# Patient Record
Sex: MALE | Race: WHITE | NOT HISPANIC OR LATINO | Employment: OTHER | URBAN - METROPOLITAN AREA
[De-identification: names, ages, dates, MRNs, and addresses within clinical notes are randomized per-mention and may not be internally consistent; named-entity substitution may affect disease eponyms.]

---

## 2024-05-16 ENCOUNTER — HOSPITAL ENCOUNTER (EMERGENCY)
Facility: HOSPITAL | Age: 39
Discharge: HOME | End: 2024-05-16
Attending: EMERGENCY MEDICINE
Payer: OTHER GOVERNMENT

## 2024-05-16 VITALS
HEART RATE: 94 BPM | WEIGHT: 240 LBS | DIASTOLIC BLOOD PRESSURE: 76 MMHG | TEMPERATURE: 98.6 F | RESPIRATION RATE: 18 BRPM | BODY MASS INDEX: 33.6 KG/M2 | OXYGEN SATURATION: 99 % | HEIGHT: 71 IN | SYSTOLIC BLOOD PRESSURE: 146 MMHG

## 2024-05-16 DIAGNOSIS — S80.862A INSECT BITE OF LEFT LOWER LEG, INITIAL ENCOUNTER: ICD-10-CM

## 2024-05-16 DIAGNOSIS — L03.116 CELLULITIS OF LEFT LOWER EXTREMITY: Primary | ICD-10-CM

## 2024-05-16 DIAGNOSIS — T78.40XA ALLERGIC REACTION, INITIAL ENCOUNTER: ICD-10-CM
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
## 2024-05-16 PROCEDURE — 99283 EMERGENCY DEPT VISIT LOW MDM: CPT

## 2024-05-16 PROCEDURE — 2500000004 HC RX 250 GENERAL PHARMACY W/ HCPCS (ALT 636 FOR OP/ED): Performed by: EMERGENCY MEDICINE

## 2024-05-16 PROCEDURE — 2500000006 HC RX 250 W HCPCS SELF ADMINISTERED DRUGS (ALT 637 FOR ALL PAYERS): Performed by: EMERGENCY MEDICINE

## 2024-05-16 RX ORDER — PREDNISONE 20 MG/1
40 TABLET ORAL ONCE
Status: COMPLETED | OUTPATIENT
Start: 2024-05-16 | End: 2024-05-16

## 2024-05-16 RX ORDER — PREDNISONE 20 MG/1
40 TABLET ORAL DAILY
Qty: 10 TABLET | Refills: 0 | Status: SHIPPED | OUTPATIENT
Start: 2024-05-16 | End: 2024-05-21

## 2024-05-16 RX ORDER — SULFAMETHOXAZOLE AND TRIMETHOPRIM 800; 160 MG/1; MG/1
1 TABLET ORAL ONCE
Status: COMPLETED | OUTPATIENT
Start: 2024-05-16 | End: 2024-05-16

## 2024-05-16 RX ORDER — FEXOFENADINE HCL 60 MG
60 TABLET ORAL 2 TIMES DAILY
Qty: 20 TABLET | Refills: 0 | Status: SHIPPED | OUTPATIENT
Start: 2024-05-16 | End: 2024-05-26

## 2024-05-16 RX ORDER — SULFAMETHOXAZOLE AND TRIMETHOPRIM 800; 160 MG/1; MG/1
1 TABLET ORAL 2 TIMES DAILY
Qty: 14 TABLET | Refills: 0 | Status: SHIPPED | OUTPATIENT
Start: 2024-05-16 | End: 2024-05-23

## 2024-05-16 RX ADMIN — PREDNISONE 40 MG: 20 TABLET ORAL at 23:15

## 2024-05-16 RX ADMIN — SULFAMETHOXAZOLE AND TRIMETHOPRIM 1 TABLET: 800; 160 TABLET ORAL at 23:15

## 2024-05-16 ASSESSMENT — COLUMBIA-SUICIDE SEVERITY RATING SCALE - C-SSRS
1. IN THE PAST MONTH, HAVE YOU WISHED YOU WERE DEAD OR WISHED YOU COULD GO TO SLEEP AND NOT WAKE UP?: NO
2. HAVE YOU ACTUALLY HAD ANY THOUGHTS OF KILLING YOURSELF?: NO
6. HAVE YOU EVER DONE ANYTHING, STARTED TO DO ANYTHING, OR PREPARED TO DO ANYTHING TO END YOUR LIFE?: NO

## 2024-05-16 ASSESSMENT — LIFESTYLE VARIABLES
TOTAL SCORE: 0
EVER FELT BAD OR GUILTY ABOUT YOUR DRINKING: NO
HAVE PEOPLE ANNOYED YOU BY CRITICIZING YOUR DRINKING: NO
HAVE YOU EVER FELT YOU SHOULD CUT DOWN ON YOUR DRINKING: NO
EVER HAD A DRINK FIRST THING IN THE MORNING TO STEADY YOUR NERVES TO GET RID OF A HANGOVER: NO

## 2024-05-16 ASSESSMENT — PAIN DESCRIPTION - LOCATION: LOCATION: LEG

## 2024-05-16 ASSESSMENT — PAIN - FUNCTIONAL ASSESSMENT: PAIN_FUNCTIONAL_ASSESSMENT: 0-10

## 2024-05-16 ASSESSMENT — PAIN DESCRIPTION - PAIN TYPE: TYPE: ACUTE PAIN

## 2024-05-16 ASSESSMENT — PAIN SCALES - GENERAL: PAINLEVEL_OUTOF10: 4

## 2024-05-17 NOTE — ED PROVIDER NOTES
Emergency Department Provider Note        MEDICAL DECISION MAKING:  Medical Decision Making  Risk  OTC drugs.  Prescription drug management.         5/16/24     Chief Complaint   Patient presents with    Insect Bite     Left lower leg      History/Exam limitations: none.   Additional history was obtained from patient.    HPI: Dc Pastor Pt presents for an insect bite to the left lower leg. Pt thinks that he got bitten when he was moving wood this morning. Bite area is now red and swollen. Tender to the touch and hurts to walk on. Pt states that he had a 101 degree temp today. Took meds with improvement.   Past medical records, Past medical history and surgical history reviewed and as documented.  History reviewed and as noted. past medical records reviewed.    Active Ambulatory Problems     Diagnosis Date Noted    No Active Ambulatory Problems     Resolved Ambulatory Problems     Diagnosis Date Noted    No Resolved Ambulatory Problems     No Additional Past Medical History        Past Surgical History:   Procedure Laterality Date    OTHER SURGICAL HISTORY  03/08/2021    Cholecystectomy    OTHER SURGICAL HISTORY  03/08/2021    Knee surgery    OTHER SURGICAL HISTORY  03/08/2021    Oral surgery        No family history on file.           No Known Allergies     Unless otherwise stated in this report the patient's positive and negative responses for review of systems for constitutional, eyes, ENT, cardiovascular, respiratory, gastrointestinal, neurological, genitourinary, musculoskeletal, and integument systems and related systems to the presenting problem are either as stated in the HPI or were not pertinent or were negative for the symptoms and/or complaints related to the presenting medical problem.    PHYSICAL EXAM  Triage/nursing notes and vital signs reviewed as available and as noted    Vitals:    05/16/24 2210   BP: 150/86   Pulse: 93   Resp: 16   Temp: 37.4 °C (99.4 °F)   TempSrc: Skin   SpO2: 97%  "  Weight: 109 kg (240 lb)   Height: 1.803 m (5' 11\")       Vital Signs reviewed and noted to be within normal limits. the patient is not hypoxic.  -General: Alert, no acute distress, patient resting comfortably  -Skin: warm, intact, no pallor noted  -Head: Normocephalic, atraumatic  -Eye: Normal conjunctiva  -Cardiac: Normal peripheral perfusion  -Respiratory: No acute distress  -Musculoskeletal: No deformity, full ROM.  Patient does have a insect bite on the left anterior lower leg with some surrounding erythema.  Small scab noted.  No purulent drainage.  Compartments soft but there is some mild localized swelling and erythema.  No calf tenderness.  No crepitus.  Joints are not affected.  Normal range of motion.  No pain out of proportion.  Neurovascularly intact.  -Neurological: alert and oriented, normal sensory and motor observed.  -Psychiatric: Cooperative    ED COURSE  Current subjective and objective findings, differential diagnosis includes but not limited to insect bite, localized reaction, cellulitis      Work-up performed to evaluate for differential diagnosis as clinically indicated   Orders Placed This Encounter   Procedures    Referral to Primary Care          Labs and imaging reviewed by me  and note         Labs Reviewed - No data to display     No orders to display            Pt course which Intervention and treatment included :    Procedure  Procedures    Medications   sulfamethoxazole-trimethoprim (Bactrim DS) 800-160 mg per tablet 1 tablet (has no administration in time range)   predniSONE (Deltasone) tablet 40 mg (has no administration in time range)        ED Course as of 05/16/24 2305   Thu May 16, 2024   2304 Patient has no clearance noted, however does report this.  Will prescribe Bactrim.  It is likely somewhat early given the longevity of symptoms however so allergic reaction is also considered especially localized.  No other systemic symptoms.  Will treat with outpatient therapy at this " time and return precautions discussed. [ML]      ED Course User Index  [ML] Marty R Lejeune, DO         Diagnoses as of 05/16/24 0490   Cellulitis of left lower extremity   Insect bite of left lower leg, initial encounter   Allergic reaction, initial encounter          DISPOSITION: Patient is stable for discharge.  Based upon my history, physical exam, evaluation and judgement regarding the aforementioned differentials, at the time of this assessment, I do not see evidence to support the presence of any life, neurologic, or limb threatening pathology in my considered differentials. Alternate non life threatening pathology has been considered, and has been ruled out based upon the findings of my evaluation. While there may be remaining pathology considered within the differential, this is not life threatening, not limb threatening, and does not warrant further emergent evaluation or treatment at this time.  Shared decision making made with the patient as applicable.  It is my judgement that further treatment and evaluation can safely proceed in the outpatient setting. Shared decision making was utilized to arrive at all clinical decisions. At this time I do not see evidence of an emergency medical condition based upon my medical screening examination.  All imaging and laboratory results were discussed with the patient in detail including acute as well as incidental findings.  I discussed the differential, results and discharge plan with the patient and/or family/friend/caregiver if present. Education and reassurance provided regards to presumed diagnosis. I emphasized the importance of follow-up with the physician I referred them to in the timeframe recommended. I explained reasons for the patient to return to the Emergency Department. Additional verbal discharge instructions were also given and discussed with the patient to supplement those generated by the EMR. We also discussed medications that were prescribed (if  any) including common side effects and interactions as well as proper dosing and administration. The patient was advised to abstain from driving, operating heavy machinery or making significant decisions while taking medications such as opiates and muscle relaxers that may impair this. All questions were addressed. They understand return precautions and discharge instructions. The patient and/or family/friend/caregiver expressed understanding    1. Cellulitis of left lower extremity  sulfamethoxazole-trimethoprim (Bactrim DS) 800-160 mg tablet      2. Insect bite of left lower leg, initial encounter  predniSONE (Deltasone) 20 mg tablet      3. Allergic reaction, initial encounter  predniSONE (Deltasone) 20 mg tablet    fexofenadine (Allegra) 60 mg tablet         -------------------------------------------------------------------    05/16/24 at 10:45 PM - Marty R LeJeune, DO   Internal & Emergency Medicine          Marty R Lejeune, DO  Resident  05/16/24 1126

## 2024-05-17 NOTE — ED TRIAGE NOTES
Pt presents for an insect bite to the left lower leg. Pt thinks that he got bitten when he was moving wood this morning. Bite area is now red and swollen. Tender to the touch and hurts to walk on. Pt states that he had a 101 degree temp today. Took meds with improvement.

## 2024-06-06 ENCOUNTER — DOCUMENTATION (OUTPATIENT)
Dept: TRANSPLANT | Facility: HOSPITAL | Age: 39
End: 2024-06-06
Payer: OTHER GOVERNMENT

## 2024-06-06 NOTE — PROGRESS NOTES
Intake reviewed. Attempted to reach Tim and a message was left for him to return our call. He is okay to schedule for virtual donor education.

## 2024-06-14 ENCOUNTER — TELEPHONE (OUTPATIENT)
Dept: TRANSPLANT | Facility: HOSPITAL | Age: 39
End: 2024-06-14
Payer: OTHER GOVERNMENT

## 2024-07-12 ENCOUNTER — TELEPHONE (OUTPATIENT)
Dept: TRANSPLANT | Facility: HOSPITAL | Age: 39
End: 2024-07-12
Payer: OTHER GOVERNMENT

## 2024-07-15 ENCOUNTER — APPOINTMENT (OUTPATIENT)
Dept: TRANSPLANT | Facility: HOSPITAL | Age: 39
End: 2024-07-15
Payer: OTHER GOVERNMENT

## 2024-07-15 ENCOUNTER — DOCUMENTATION (OUTPATIENT)
Dept: TRANSPLANT | Facility: HOSPITAL | Age: 39
End: 2024-07-15
Payer: OTHER GOVERNMENT

## 2024-07-15 NOTE — PROGRESS NOTES
Patient received education regarding the following topics as part of their living donor evaluation:  The evaluation process, including:  ·     Transplant team members and roles   ·     Required consultations and testing  ·     Selection criteria and suitability for donation  ·     Psychosocial and financial considerations for a successful transplant  ·     Patient responsibilities, including the necessity of adhering to a strict medical regimen  An overview of the surgical procedure   Potential medical, surgical, and psychosocial risks to transplantation, including:  ·     Wound infection  ·     Pneumonia  ·     Blood clot formation  ·     Complications with remaining kidney including kidney failure and need for dialysis or kidney transplant  ·     Arrhythmias and cardiovascular collapse  ·     Multi-organ system failure  ·     Death  ·     Depression  ·     Post-Traumatic Stress Disorder  ·     Generalized anxiety, issues of dependence, and feelings of guilt  Available alternatives to transplantation  National and [facility] outcomes for one-year patient and graft-survival from the most recent [facility] program-specific report.   Donor risk factors that could affect the success of the transplant and the health of the patient, including:  ·     Donor age  ·     Donor medical and social history  ·     Condition of the organ  ·     Risk of rod cancer, HIV, Hepatitis B, Hepatitis C, or malaria if the infection is not detectable at the time of donation  Patient's right to withdraw consent for donation at any time during the process. Patient's consent to donate willingly and without pressure (coercion) from anyone, and will not receive payment or financial reward for donating organ.  Transplants not performed in a Medicare-approved transplant center could affect the patient's ability to have immunosuppression medication paid for under Medicare part B.   Multiple listing options.     Patient was given the  opportunity to have questions answered and understands that the living donor team will be available to assist the patient throughout the entire donation process. Patient was provided a copy of the signed informed consent for living donor donation.     Signed evaluation informed consent received? 07/15/2024

## 2024-07-17 DIAGNOSIS — Z52.4 DONOR OF KIDNEY FOR TRANSPLANT: Primary | ICD-10-CM

## 2024-07-24 ENCOUNTER — TELEPHONE (OUTPATIENT)
Dept: TRANSPLANT | Facility: HOSPITAL | Age: 39
End: 2024-07-24

## 2024-08-08 DIAGNOSIS — Z52.4 DONOR OF KIDNEY FOR TRANSPLANT: Primary | ICD-10-CM

## 2024-08-20 ENCOUNTER — TELEPHONE (OUTPATIENT)
Dept: TRANSPLANT | Facility: HOSPITAL | Age: 39
End: 2024-08-20
Payer: OTHER GOVERNMENT

## 2024-08-28 ENCOUNTER — LAB (OUTPATIENT)
Dept: LAB | Facility: LAB | Age: 39
End: 2024-08-28
Payer: SUBSIDIZED

## 2024-08-28 ENCOUNTER — HOSPITAL ENCOUNTER (OUTPATIENT)
Dept: CARDIOLOGY | Facility: HOSPITAL | Age: 39
Discharge: HOME | End: 2024-08-28
Payer: OTHER GOVERNMENT

## 2024-08-28 ENCOUNTER — SOCIAL WORK (OUTPATIENT)
Dept: TRANSPLANT | Facility: HOSPITAL | Age: 39
End: 2024-08-28
Payer: OTHER GOVERNMENT

## 2024-08-28 ENCOUNTER — APPOINTMENT (OUTPATIENT)
Dept: TRANSPLANT | Facility: HOSPITAL | Age: 39
End: 2024-08-28
Payer: OTHER GOVERNMENT

## 2024-08-28 ENCOUNTER — HOSPITAL ENCOUNTER (OUTPATIENT)
Dept: RADIOLOGY | Facility: HOSPITAL | Age: 39
Discharge: HOME | End: 2024-08-28
Payer: OTHER GOVERNMENT

## 2024-08-28 ENCOUNTER — OFFICE VISIT (OUTPATIENT)
Dept: TRANSPLANT | Facility: HOSPITAL | Age: 39
End: 2024-08-28
Payer: OTHER GOVERNMENT

## 2024-08-28 ENCOUNTER — NURSE ONLY (OUTPATIENT)
Facility: HOSPITAL | Age: 39
End: 2024-08-28
Payer: OTHER GOVERNMENT

## 2024-08-28 ENCOUNTER — DOCUMENTATION (OUTPATIENT)
Dept: TRANSPLANT | Facility: HOSPITAL | Age: 39
End: 2024-08-28
Payer: OTHER GOVERNMENT

## 2024-08-28 VITALS
SYSTOLIC BLOOD PRESSURE: 130 MMHG | TEMPERATURE: 97.3 F | DIASTOLIC BLOOD PRESSURE: 86 MMHG | OXYGEN SATURATION: 96 % | HEIGHT: 71 IN | WEIGHT: 258.4 LBS | HEART RATE: 74 BPM | BODY MASS INDEX: 36.18 KG/M2

## 2024-08-28 VITALS
HEIGHT: 71 IN | WEIGHT: 258.4 LBS | OXYGEN SATURATION: 96 % | DIASTOLIC BLOOD PRESSURE: 86 MMHG | HEART RATE: 74 BPM | SYSTOLIC BLOOD PRESSURE: 130 MMHG | TEMPERATURE: 97.3 F | BODY MASS INDEX: 36.18 KG/M2

## 2024-08-28 DIAGNOSIS — Z52.4 DONOR OF KIDNEY FOR TRANSPLANT: Primary | ICD-10-CM

## 2024-08-28 DIAGNOSIS — Z00.5 ENCOUNTER FOR EVALUATION TO BE TRANSPLANT DONOR: Primary | ICD-10-CM

## 2024-08-28 DIAGNOSIS — Z52.4 DONOR OF KIDNEY FOR TRANSPLANT: ICD-10-CM

## 2024-08-28 LAB
ABO GROUP (TYPE) IN BLOOD: NORMAL
ALBUMIN (MG/24HR) IN 24 HOUR URINE: NORMAL
ALBUMIN SERPL BCP-MCNC: 4.4 G/DL (ref 3.4–5)
ALP SERPL-CCNC: 52 U/L (ref 33–120)
ALT SERPL W P-5'-P-CCNC: 24 U/L (ref 10–52)
ANION GAP SERPL CALC-SCNC: 14 MMOL/L (ref 10–20)
APTT PPP: 34 SECONDS (ref 27–38)
AST SERPL W P-5'-P-CCNC: 27 U/L (ref 9–39)
BILIRUB SERPL-MCNC: 0.4 MG/DL (ref 0–1.2)
BUN SERPL-MCNC: 14 MG/DL (ref 6–23)
CALCIUM SERPL-MCNC: 9.2 MG/DL (ref 8.6–10.6)
CHLORIDE SERPL-SCNC: 103 MMOL/L (ref 98–107)
CHOLEST SERPL-MCNC: 163 MG/DL (ref 0–199)
CHOLESTEROL/HDL RATIO: 3.4
CMV IGG AVIDITY SERPL IA-RTO: REACTIVE %
CO2 SERPL-SCNC: 27 MMOL/L (ref 21–32)
COLLECT DURATION TIME SPEC: 24 HRS
CREAT 24H UR-MCNC: 204.1 MG/DL (ref 20–370)
CREAT 24H UR-MRATE: 2.03 G/24 H (ref 0.87–2.41)
CREAT CL 24H UR+SERPL-VRATE: 116 ML/MIN
CREAT SERPL-MCNC: 1.21 MG/DL (ref 0.5–1.3)
CREAT SERPL-MCNC: 1.21 MG/DL (ref 0.5–1.3)
EBV EA IGG SER QL: NEGATIVE
EBV NA AB SER QL: POSITIVE
EBV VCA IGG SER IA-ACNC: POSITIVE
EBV VCA IGM SER IA-ACNC: NEGATIVE
EGFRCR SERPLBLD CKD-EPI 2021: 79 ML/MIN/1.73M*2
EGFRCR SERPLBLD CKD-EPI 2021: 79 ML/MIN/1.73M*2
ERYTHROCYTE [DISTWIDTH] IN BLOOD BY AUTOMATED COUNT: 13 % (ref 11.5–14.5)
EST. AVERAGE GLUCOSE BLD GHB EST-MCNC: 108 MG/DL
GLUCOSE P FAST SERPL-MCNC: 95 MG/DL (ref 74–99)
GLUCOSE SERPL-MCNC: 95 MG/DL (ref 74–99)
HBA1C MFR BLD: 5.4 %
HBV CORE IGM SER QL: NONREACTIVE
HBV SURFACE AB SER-ACNC: 218.3 MIU/ML
HBV SURFACE AG SERPL QL IA: NONREACTIVE
HCT VFR BLD AUTO: 42.3 % (ref 41–52)
HCV AB SER QL: NONREACTIVE
HDLC SERPL-MCNC: 48.2 MG/DL
HGB BLD-MCNC: 14.3 G/DL (ref 13.5–17.5)
HIV 1+2 AB+HIV1 P24 AG SERPL QL IA: NONREACTIVE
INR PPP: 1 (ref 0.9–1.1)
LDLC SERPL CALC-MCNC: 73 MG/DL
MCH RBC QN AUTO: 29.9 PG (ref 26–34)
MCHC RBC AUTO-ENTMCNC: 33.8 G/DL (ref 32–36)
MCV RBC AUTO: 89 FL (ref 80–100)
MICROALBUMIN PANEL 24H UR: <7 MG/L
NON HDL CHOLESTEROL: 115 MG/DL (ref 0–149)
NRBC BLD-RTO: 0 /100 WBCS (ref 0–0)
PHOSPHATE SERPL-MCNC: 2.9 MG/DL (ref 2.5–4.9)
PLATELET # BLD AUTO: 225 X10*3/UL (ref 150–450)
POTASSIUM SERPL-SCNC: 4.1 MMOL/L (ref 3.5–5.3)
PROT 24H UR-MCNC: 6 MG/DL (ref 5–25)
PROT 24H UR-MRATE: 60 MG/24H (ref 0–149)
PROT SERPL-MCNC: 6.8 G/DL (ref 6.4–8.2)
PROTHROMBIN TIME: 10.8 SECONDS (ref 9.8–12.8)
RBC # BLD AUTO: 4.78 X10*6/UL (ref 4.5–5.9)
RH FACTOR (ANTIGEN D): NORMAL
SODIUM SERPL-SCNC: 140 MMOL/L (ref 136–145)
SPECIMEN VOL 24H UR: 994 ML
TREPONEMA PALLIDUM IGG+IGM AB [PRESENCE] IN SERUM OR PLASMA BY IMMUNOASSAY: NONREACTIVE
TRIGL SERPL-MCNC: 207 MG/DL (ref 0–149)
URATE SERPL-MCNC: 6.5 MG/DL (ref 4–7.5)
VLDL: 41 MG/DL (ref 0–40)
WBC # BLD AUTO: 5.2 X10*3/UL (ref 4.4–11.3)

## 2024-08-28 PROCEDURE — 85730 THROMBOPLASTIN TIME PARTIAL: CPT

## 2024-08-28 PROCEDURE — 82043 UR ALBUMIN QUANTITATIVE: CPT

## 2024-08-28 PROCEDURE — 86664 EPSTEIN-BARR NUCLEAR ANTIGEN: CPT

## 2024-08-28 PROCEDURE — 86900 BLOOD TYPING SEROLOGIC ABO: CPT

## 2024-08-28 PROCEDURE — 85610 PROTHROMBIN TIME: CPT

## 2024-08-28 PROCEDURE — 84100 ASSAY OF PHOSPHORUS: CPT

## 2024-08-28 PROCEDURE — 85027 COMPLETE CBC AUTOMATED: CPT

## 2024-08-28 PROCEDURE — 82565 ASSAY OF CREATININE: CPT

## 2024-08-28 PROCEDURE — 84550 ASSAY OF BLOOD/URIC ACID: CPT

## 2024-08-28 PROCEDURE — 82570 ASSAY OF URINE CREATININE: CPT

## 2024-08-28 PROCEDURE — 87340 HEPATITIS B SURFACE AG IA: CPT

## 2024-08-28 PROCEDURE — 80053 COMPREHEN METABOLIC PANEL: CPT

## 2024-08-28 PROCEDURE — 81050 URINALYSIS VOLUME MEASURE: CPT

## 2024-08-28 PROCEDURE — 86663 EPSTEIN-BARR ANTIBODY: CPT

## 2024-08-28 PROCEDURE — 84156 ASSAY OF PROTEIN URINE: CPT

## 2024-08-28 PROCEDURE — 2550000001 HC RX 255 CONTRASTS: Performed by: SURGERY

## 2024-08-28 PROCEDURE — 86705 HEP B CORE ANTIBODY IGM: CPT

## 2024-08-28 PROCEDURE — 82575 CREATININE CLEARANCE TEST: CPT

## 2024-08-28 PROCEDURE — 83036 HEMOGLOBIN GLYCOSYLATED A1C: CPT

## 2024-08-28 PROCEDURE — 82947 ASSAY GLUCOSE BLOOD QUANT: CPT

## 2024-08-28 PROCEDURE — 86780 TREPONEMA PALLIDUM: CPT

## 2024-08-28 PROCEDURE — 93005 ELECTROCARDIOGRAM TRACING: CPT

## 2024-08-28 PROCEDURE — 87389 HIV-1 AG W/HIV-1&-2 AB AG IA: CPT

## 2024-08-28 PROCEDURE — 86803 HEPATITIS C AB TEST: CPT

## 2024-08-28 PROCEDURE — 74174 CTA ABD&PLVS W/CONTRAST: CPT

## 2024-08-28 PROCEDURE — 80061 LIPID PANEL: CPT

## 2024-08-28 PROCEDURE — 86901 BLOOD TYPING SEROLOGIC RH(D): CPT

## 2024-08-28 PROCEDURE — 99215 OFFICE O/P EST HI 40 MIN: CPT | Performed by: TRANSPLANT SURGERY

## 2024-08-28 PROCEDURE — 86665 EPSTEIN-BARR CAPSID VCA: CPT

## 2024-08-28 PROCEDURE — 99214 OFFICE O/P EST MOD 30 MIN: CPT | Mod: 25

## 2024-08-28 PROCEDURE — 86706 HEP B SURFACE ANTIBODY: CPT

## 2024-08-28 PROCEDURE — 86644 CMV ANTIBODY: CPT

## 2024-08-28 ASSESSMENT — ENCOUNTER SYMPTOMS
CONFUSION: 0
ADENOPATHY: 0
HEMATURIA: 0
NERVOUS/ANXIOUS: 0
HEADACHES: 0
CONSTIPATION: 0
HALLUCINATIONS: 0
SHORTNESS OF BREATH: 0
FREQUENCY: 0
DIARRHEA: 0
LIGHT-HEADEDNESS: 0
WEAKNESS: 0
CONFUSION: 0
BACK PAIN: 0
COUGH: 0
FEVER: 0
DIZZINESS: 0
AGITATION: 0
VOMITING: 0
PALPITATIONS: 0
EYES NEGATIVE: 1
HEMATURIA: 0
COLOR CHANGE: 0
DYSURIA: 0
DIARRHEA: 0
FREQUENCY: 0
CHEST TIGHTNESS: 0
NAUSEA: 0
ARTHRALGIAS: 0
COUGH: 0
ABDOMINAL DISTENTION: 0
ABDOMINAL DISTENTION: 0
ABDOMINAL PAIN: 0
CHILLS: 0
SHORTNESS OF BREATH: 0

## 2024-08-28 ASSESSMENT — PATIENT HEALTH QUESTIONNAIRE - PHQ9
8. MOVING OR SPEAKING SO SLOWLY THAT OTHER PEOPLE COULD HAVE NOTICED. OR THE OPPOSITE, BEING SO FIGETY OR RESTLESS THAT YOU HAVE BEEN MOVING AROUND A LOT MORE THAN USUAL: NOT AT ALL
3. TROUBLE FALLING OR STAYING ASLEEP OR SLEEPING TOO MUCH: NOT AT ALL
SUM OF ALL RESPONSES TO PHQ QUESTIONS 1-9: 0
5. POOR APPETITE OR OVEREATING: NOT AT ALL
1. LITTLE INTEREST OR PLEASURE IN DOING THINGS: NOT AT ALL
6. FEELING BAD ABOUT YOURSELF - OR THAT YOU ARE A FAILURE OR HAVE LET YOURSELF OR YOUR FAMILY DOWN: NOT AT ALL
1. LITTLE INTEREST OR PLEASURE IN DOING THINGS: NOT AT ALL
4. FEELING TIRED OR HAVING LITTLE ENERGY: NOT AT ALL
7. TROUBLE CONCENTRATING ON THINGS, SUCH AS READING THE NEWSPAPER OR WATCHING TELEVISION: NOT AT ALL
2. FEELING DOWN, DEPRESSED OR HOPELESS: NOT AT ALL
9. THOUGHTS THAT YOU WOULD BE BETTER OFF DEAD, OR OF HURTING YOURSELF: NOT AT ALL
SUM OF ALL RESPONSES TO PHQ9 QUESTIONS 1 AND 2: 0
SUM OF ALL RESPONSES TO PHQ9 QUESTIONS 1 & 2: 0
2. FEELING DOWN, DEPRESSED OR HOPELESS: NOT AT ALL

## 2024-08-28 ASSESSMENT — ANXIETY QUESTIONNAIRES
1. FEELING NERVOUS, ANXIOUS, OR ON EDGE: NOT AT ALL
6. BECOMING EASILY ANNOYED OR IRRITABLE: NOT AT ALL
5. BEING SO RESTLESS THAT IT IS HARD TO SIT STILL: NOT AT ALL
GAD7 TOTAL SCORE: 0
7. FEELING AFRAID AS IF SOMETHING AWFUL MIGHT HAPPEN: NOT AT ALL
4. TROUBLE RELAXING: NOT AT ALL
2. NOT BEING ABLE TO STOP OR CONTROL WORRYING: NOT AT ALL
3. WORRYING TOO MUCH ABOUT DIFFERENT THINGS: NOT AT ALL

## 2024-08-28 ASSESSMENT — PAIN SCALES - GENERAL
PAINLEVEL: 0-NO PAIN
PAINLEVEL: 0-NO PAIN

## 2024-08-28 NOTE — PROGRESS NOTES
Subjective   Dc Pastor is a 38 y.o. male who presents for donor evaluation visit.     Patient is a healthy 38 male who wants to be a kidney donor.  His intended recipient is from [place] and on dialysis, he is unsure whether she is listed for transplant currently or at which center.    He denies any significant medical history such as HTN, DM, kidney diseases  PSHx: lap radha and knee surgery    Shx: non smoker, Vincent    Fhx: mother with cervical ca      Review of Systems   Constitutional:  Negative for chills and fever.   HENT: Negative.  Negative for congestion.    Eyes: Negative.    Respiratory:  Negative for cough and shortness of breath.    Cardiovascular:  Negative for chest pain.   Gastrointestinal:  Negative for abdominal distention, abdominal pain, constipation and diarrhea.   Endocrine: Negative for cold intolerance and heat intolerance.   Genitourinary:  Negative for dysuria, frequency, hematuria and urgency.   Musculoskeletal:  Negative for arthralgias.   Skin:  Negative for color change.   Allergic/Immunologic: Negative for environmental allergies.   Neurological:  Negative for dizziness, weakness and light-headedness.   Hematological:  Negative for adenopathy.   Psychiatric/Behavioral:  Negative for agitation, confusion and hallucinations.         Objective   Vitals:    08/28/24 0902   BP: 130/86   Pulse:    Temp:    SpO2:        Physical Exam  Constitutional:       Appearance: Normal appearance.      Comments: Muscular appearance   HENT:      Head: Normocephalic and atraumatic.      Nose: Nose normal.   Eyes:      Pupils: Pupils are equal, round, and reactive to light.   Cardiovascular:      Rate and Rhythm: Normal rate.   Pulmonary:      Effort: Pulmonary effort is normal. No respiratory distress.   Abdominal:      General: There is no distension.      Palpations: Abdomen is soft. There is no mass.      Comments: Flat abdomen   Musculoskeletal:         General: No swelling. Normal range of  "motion.      Cervical back: Normal range of motion.   Skin:     General: Skin is warm and dry.   Neurological:      General: No focal deficit present.      Mental Status: He is alert and oriented to person, place, and time.   Psychiatric:         Mood and Affect: Mood normal.         Behavior: Behavior normal.          Lab Review    Blood Type: No results found for: \"ABORH\"  Lab Results   Component Value Date    CREATININE 1.06 02/21/2021    K 3.7 02/21/2021    GLUCOSE 114 (H) 02/21/2021    HCT 44.3 02/21/2021    WBC 7.7 02/21/2021     02/21/2021    CALCIUM 9.5 02/21/2021     Lab Results   Component Value Date    WBC 7.7 02/21/2021    HGB 15.2 02/21/2021    HCT 44.3 02/21/2021    MCV 89 02/21/2021     02/21/2021     Lab Results   Component Value Date    GLUCOSE 114 (H) 02/21/2021    CALCIUM 9.5 02/21/2021     02/21/2021    K 3.7 02/21/2021    CO2 29 02/21/2021     02/21/2021    BUN 15 02/21/2021    CREATININE 1.06 02/21/2021     Kidney donor education and discussion    I had a long discussion with the patient concerning the risks of kidney donation. I specifically pointed out that the donor gets no benefit from this medical intervention and only incurs risk. We discussed the fact that they should feel comfortable removing themselves from the donation process at any time should they feel uncomfortable with donating.     The confidentiality of the donor will be maintained at all times. I reiterated that I cannot share medical information with the donor about the recipient or vice versa. I reiterated the fact that the recipient may have risk factors for a bad outcome that I cannot necessarily share with the donor. It is a federal crime to receive any compensation monetary or otherwise for donating a kidney. If we find out this is occurring, we will terminate the process.     We discussed the importance of not smoking. We discussed the risks of the surgery which include but are not limited to " bleeding, infection, scarring, pain, DVT, PE, kidney failure, organ failure, heart attack, stroke, damage to surrounding structures, obesity, fatigue, nausea/bloating, risk for small bowel obstruction, risk for development of hernias, and death.     We discussed the post-operative course both in the hospital and once they are discharged. We discussed the follow-up schedule which will be 2 weeks post-surgery with me and at 6 months 1 year and 2 years post-surgery with nephrology. We discussed the financial implications of donation including job loss and difficulties obtaining insurances. We discussed the possibility of trauma to the one remaining kidney and should this happen they may going to kidney failure. We discussed the fact that NSAID use should be avoided for the rest of their life. If they do in fact developed kidney failure, they will require dialysis. They do receive extra points to move up the  donor list should they require a kidney transplant themselves.    Further work-up includes:  Time was given to provide answers to all questions.  The donor expressed understanding and wished to proceed with donation.   The donor will be taken to committee for final decision on appropriateness once the entire evaluation is completed.         Assessment/Plan    Diagnoses:   1. Donor of kidney for transplant      Dc Pastor is a good candidate for kidney donation.  His current BMI is 36.5 and I advised him to lose some weight  ~10 Ibs weight loss will bring his BMI < 35.  But his build is quite muscular in chest/arm/thighs and the abdomen is flat, therefore suitable  His BP in clinic is 146/93 initially and repeat is 130/86, we will obtain 24 hour home monitoring  History of depression and stable on medication

## 2024-08-28 NOTE — PROGRESS NOTES
Spoke to pt on phone for virtual donor apt Pt understands he is not to be billed for txp related services. Discussed Preex & LI access. Pt understands if treatment would be needed that would not be txp related he would be billed.

## 2024-08-28 NOTE — PROGRESS NOTES
"Transplant Nephrology Evaluation of Donor :    Dc Pastor was here to get evaluated for being a potential Donor .    History of Present illness: Mr. Irwin is a 38-year-old healthy male with no significant medical history was here to be evaluated to be a potential donor to his friend's assisted who currently is under process of evaluation in [place] .  -He does have posttraumatic stress disorder when he was in [employer] and was currently on escitalopram and bupropion.  He has been currently stable never been institutionalized because of PTSD.  -He denied any history of hypertension or diabetes or kidney stones or urinary tract infections.  -He denied any coronary artery disease or history of stroke or GI or lung issues.    Past Medical HX: PTSD on medications otherwise no other medical history    Surgical Hx: History of cholecystectomy in  and left knee reconstruction in     Family Hx : Mom had cervical cancer survivor she is alive, dad had any atrophic lateral sclerosis and  at the age of 51.  His sister is healthy and had 2 daughters who are healthy    Social Hx : He is currently having [employer] self-employment lives with his wife and 2 kids.  Drinks occasionally denied any smoking or use of drugs    Review of Systems   Respiratory:  Negative for cough, chest tightness and shortness of breath.    Cardiovascular:  Negative for chest pain, palpitations and leg swelling.   Gastrointestinal:  Negative for abdominal distention, diarrhea, nausea and vomiting.   Genitourinary:  Negative for frequency, hematuria and urgency.   Musculoskeletal:  Negative for back pain.   Neurological:  Negative for headaches.   Psychiatric/Behavioral:  Negative for confusion. The patient is not nervous/anxious.         Objective:  Visit Vitals  /86 (BP Location: Left arm)   Pulse 74   Temp 36.3 °C (97.3 °F) (Temporal)   Ht 1.791 m (5' 10.5\")   Wt 117 kg (258 lb 6.4 oz)   SpO2 96%   BMI 36.55 kg/m²   Smoking " Status Former   BSA 2.41 m²      Physical Exam  HENT:      Head: Normocephalic and atraumatic.      Nose: Nose normal.      Mouth/Throat:      Mouth: Mucous membranes are moist.   Eyes:      Extraocular Movements: Extraocular movements intact.      Pupils: Pupils are equal, round, and reactive to light.   Cardiovascular:      Rate and Rhythm: Normal rate.      Pulses: Normal pulses.      Heart sounds: Normal heart sounds.   Pulmonary:      Effort: Pulmonary effort is normal.   Abdominal:      Palpations: Abdomen is soft.      Tenderness: There is no abdominal tenderness. There is no guarding.   Musculoskeletal:         General: Normal range of motion.      Cervical back: Normal range of motion.   Skin:     General: Skin is warm.   Neurological:      General: No focal deficit present.      Mental Status: Mental status is at baseline.   Psychiatric:         Mood and Affect: Mood normal.            Current Outpatient Medications:     fexofenadine (Allegra) 60 mg tablet, Take 1 tablet (60 mg) by mouth 2 times a day for 10 days., Disp: 20 tablet, Rfl: 0     [unfilled]     No images are attached to the encounter.     Assessment and Plan :Dc Pastor 38 y.o. healthy male with no significant medical history was here to be evaluated to be a potential donor to his friend's assisted who currently is under process of evaluation in [place] .    -Seems to be a good candidate for being a potential donor.  -His blood pressure on initial measurement is around 146/93 and repeat is 130/86 considering likely whitecoat hypertension but requested for 24-hour monitor with ambulatory blood pressure monitoring.  -His current kidney function with EGFR is 79 with creatinine of 1.21, Cystatin C EGFR is around 98.  Awaiting for 24-hour creatinine clearance calculations.  -Urinary albumin is less than 7, total protein in the urine is around 6 which is normal, urine analysis is bland.  -His A1c is around 5.4-known  diabetic.  -His body mass index is a mildly higher than required recommended lifestyle modifications with regular exercise.  -Will follow-up with the imaging and further testing according to our donor protocol.  -Will discuss in the committee once we get updated testing.    I had a long discussion with the patient concerning the risks of kidney donation. I specifically pointed out that the donor gets no benefit from this medical intervention and only incurs risk. We discussed the fact that they should feel comfortable removing themselves from the donation process at any time should they feel uncomfortable with donating.     The confidentiality of the donor will be maintained at all times. I reiterated that I cannot share medical information with the donor about the recipient or vice versa. I reiterated the fact that the recipient may have risk factors for a bad outcome that I cannot necessarily share with the donor. It is a federal crime to receive any compensation monetary or otherwise for donating a kidney. If we find out this is occurring, we will terminate the process.     We discussed the importance of not smoking. We discussed the risks of the surgery which include but are not limited to bleeding, infection, scarring, pain, DVT, PE, kidney failure, organ failure, heart attack, stroke, damage to surrounding structures, obesity, fatigue, nausea/bloating, risk for small bowel obstruction, risk for development of hernias, and death.     We discussed the post-operative course both in the hospital and once they are discharged. We discussed the follow-up schedule which will be 2 weeks post-surgery with me and at 6 months 1 year and 2 years post-surgery with nephrology. We discussed the financial implications of donation including job loss and difficulties obtaining insurance. We discussed the possibility of trauma to the one remaining kidney and should this happen they may go to kidney failure. We discussed the fact  that NSAID use should be avoided for the rest of their life. If they do in fact develop kidney failure, they will require dialysis. They do receive extra points to move up the  donor list should they require a kidney transplant themselves.      Further work-up includes:  Time was given to provide answers to all questions.  The donor expressed understanding and wished to proceed with donation.   The donor will be taken to committee for final decision on appropriateness once the entire evaluation is completed.      Sophie Romo MD    Notes created by Mercedes -Please excuse the Typos .

## 2024-08-28 NOTE — PROGRESS NOTES
Dc was seen in clinic this morning for his donor evaluation. He will need to complete a 24 ABPM and a visit with Dr. Herrera. BMI 36.5 today and Dr. Oconnell feels that he is very muscular with a flat abdomen and can complete the remainder of his evaluation. Dr. Oconnell asked Tim to lose 10 lbs. iTm's recipient is located in [place] but transplant center is unknown at this time. He will find out this information and let us know so that we can make contact.

## 2024-08-28 NOTE — PROGRESS NOTES
"Encounter    Visit Type Initial Visit       Location: [place] [address]    Barriers to Communication / Understanding:    Language  Vision  Hearing  Other       Present     Accompanied By: Self    Organ For Donation: Kidney    Relationship to Recipient: Emotional Nyasia lives in [place]     Identification Process    Describe Relationship with Recipient Pt reported \"he was good friends with recipient's  brother whom he served in the [employer] with\".     How was donor identified Pt reported \"he was on Facebook looking at [employer] stuff for recipients  brother and saw the family posted about recipient's need of a kidney transplant\".     Yes Is the recipient aware of your offer    No To your knowledge are there other potential donors     Donor is aware they can change their mind at any time    Donor is aware the reason for the change of mind will be kept confidential    Organ donation is of donor's own free will    Their decision is free of inducement, coercion or other external pressures    Donor is not receiving anything in exchange for their organ     Primary motivation to be a donor Pt reported \"Being good friends with recipient's brother who passed away I thought this would be a nice thing to do for her\". Pt also reported \"he has thought about being a donor in the past\".     Has patient been persuaded or dissuaded in any way Denied     How will donation impact your relationship PT reported he imagines it may make a stronger bond, but nothing negative.     Prior altruistic behaviors   None  Other      Registered Organ Donor on License  Blood Donor  Bone Marrow Donor       Donors process for making important decisions Pt reported “he weighs both the pro's pros and con's cons of his options and takes his family into account. into account his family”.       Patient's thought process seems to be     Adequate  Idealistic  Grandiose  Other     Patient's insight presents as     Adequate  Denial  " "Absent  Other     Other Comments:    Health Status of Donor    [facility]        Date of Last Physical 6 months ago           How Often annually  Current Health Pt reported Pretty Good.  Current Meds / Supplement Use none  Past Surgical Experience Pt reported knee surgery and gull bladder removal.     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     Knowledge of alternative treatments for recipient No    Are they on dialysis Yes    Patient believes the recipient is compliant with their health and will be able to care for the donated organ Yes    Knowledge of Transplant / Donation:   Patient is able to make an informed decision      Patient has received education regarding medical, psychosocial and financial risks related to living donation        Patient Understand Risks of Donation  Yes Adverse findings        Yes Infection        Yes Complications              Yes Pain, discomfort, and bloating  Yes Nerve injury  Yes Bleeding  Yes Potential scar               Yes Alteration in kidney functioning                                           Yes Fatigue  Yes Death                               Yes Potential recipient complications were discussed with patient including:  Possibility of delayed kidney function  Dialysis after transplant (< 4%)  Failure of kidney after transplant in first year (5%)  Infection  Return of original disease  Death    Patient has an awareness of the two possible procedures laparoscopic or open nephrectomy Yes      Patient Understands Recovery and Follow Up From Donation  Yes length of stay             Yes appointments    Education:   ISIAH COLUNGA education: Bachelor's Degree     Yes Literate  Yes Computer literate   Yes Internet access                              No ESL  No IEP  No Learning Disability       No Developmental " "Disability    Sources of Income Pt reported Self Employed    Will patient be in a paid status during recovery No. 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     Does patient have financial concerns No    Does the patient have health insurance Yes    Patient Understands Financial Risks of Donation    Yes Personal expenses  Yes Expense of travel / lodging  Yes Expense of childcare  Yes Lost wages may not be reimbursed  Yes Need for lifelong follow-up at donor's expense  Yes Future health problems experienced by the donor following donation may not be covered by the recipient's health insurance  Yes Negative impact on ability to obtain or maintain affordable life, health and disability insurance    Relationship Status / Family Dynamic:  Single    Yes        How long             14   years    Describe Relationship \"Good\".             How long                            Describe Relationship                When                                 When  In a Relationship                How Long                           Describe Relationship    Spouse / SO Name           Fabi                Age       39           Health  Good  Spouses Education Level SW LD education : Assoc. Degree    Other Caregiver Responsibilities: None    Children:  Yes # Biological 2 Twin daughters                             # Adopted           # Step Children         Child #1 Name Sharon     Age 11                  Health Good    Lives With patient  How Much Contact Daily  Comment     Child #2 Name Fadia    Age       11           Health Good    Lives With patient  How Much Contact Daily    Parents:   Raised By One Biological Parent Mother    Did the patient have contact with the other parent Yes    Mother  No     Age                        Cause of Death   Father  Yes       Age        50's        Cause of Death ALS disease    Living Parent #1 Jennifer, lives in " [place], has weekly contact.  Living Parent #2    Siblings:                  # Biological 1 Sister                         # Half Siblings                    # Step Siblings      Sibling #1 Marilyn, lives in [place], has monthly contact.  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   If so, is it paid time off No  If not, will this impact your finances No   Did they attend education classes Yes   Do they have other caregiver responsibilities (child or eldercare) Yes, Pt reported her father can help with the children.   Do they have their own conditions which may prevent them from providing care for you No  (Medical, psychological, physical limitations)    Are they available on short notice Yes   Are they reliable Yes   Are they responsible Yes   Are they able to understand and process new information Yes   Do they have reliable transportation or will you allow them to use your vehicle Yes   Are they currently involved in your care Yes   Comments    Secondary Support   Name Cory        Phone [phone]       Age 64  Relationship to Patient JEAN MARIE  If employed, can they take time off work  Retired  If so, is it paid time off    If not, will this impact your finances    Did they attend education classes No   Do they have other caregiver responsibilities (child or eldercare) No   Do they have their own conditions which may prevent them from providing care for you No  (Medical, psychological, physical limitations)    Are they available on short notice Yes   Are they reliable Yes   Are they responsible Yes   Are they able to understand and process new information Yes   Do they have reliable transportation or will you allow them to use your vehicle Yes   Are they currently involved in your care Yes   Comments    Alternate Support  "  Alternate Support     Housing:  Yes Adequate    Owns home  Type of Home House  Who Lives with Patient: Wife and 2 daughters   Distance to [facility] 1 hr                Pets 1 dog    Transportation:  Yes Adequate  # Licensed Drivers in the Home 2  Does Patient Drive Yes   If not, why   # Reliable Vehicles 4  Does Patient use Public Transportation No  Does Patient use Medical Transportation No  Comments     Mental Health    Cognition:  No impairment observed / reported    The patient reports their mood as Pt reported \"Okay\".    Reported Mental Health Diagnosis  Pt reported he was diagnosed with Anxiety related PTSD after serving in [employer] 2008.  Family History of Mental Health Diagnosis Denied  What are patient's current stressors pt denied    Current Medications:  No  Mental Health Meds       Denied Rx'd by   Sleep Meds        Denied                 Rx'd by   Pain Meds           Denied                 Rx'd by     OTC Meds None.  Past Medications: Pt reported he has taken in the past \"Lexapro, Bupropion, and Seroquel\". Pt reported he found MH medications \"helpful\". Pt reported \"he stopped taking MH medications in 2022\". Pt shared he worked with his doctors to titrate himself off of MH medications.     Counseling Currently  [facility] Pt reported he sees his psychologist every 3-6 months and his psychiatrist every 6 months. Pt reported \"he finds both services beneficial\".    IOP  Has patient ever been hospitalized for mental health reasons No                  Was the hospitalization voluntary   Duration                  Where                                  When  Describe situation    Discharge Plan for Follow Up  Was Discharge Plan Completed     Referral to Transplant Psych Yes  No Is patient under 25 years old  No Is patient a non-directed donor  No Nutcracker syndrome      Suicide Assessment:     History of Suicide Ideation No                  Timeframe                        "     Frequency   Plan Created  Intent to Follow Through  Outcome      History of Suicide Attempt No         History of Suicidal Ideation in the past 3 months No         Intensity                  Duration                    Description of Plan      Plans thought of  Intent to Follow Through  Highest Level of Intent to Follow Through    Current Plan for Safety    Plan for Follow-Up      In the past 6 months, has anyone physically, sexually, or emotionally abused you? Denied     Ever in the past, has anyone physically, sexually, or emotionally abused you? Pt denied    Patient's Reported Trauma History?  experience.     Does Patient Feel Safe in Home Yes        What are patient's coping behaviors Pt reported fishing, camping, and working.     Faith / Spirituality [employer]    Attitude toward interviewer Cooperative and Appropriate    Eye Contact Patient maintained good eye contact throughout appointment    Appearance The patient was neatly groomed, appropriately dressed and adequately nourished    Affect Appropriate    Thought Process Appropriate    Is the patient's mental health stable enough to proceed with living donation process and donation Yes    Does the patient understand the psychological risks of living donation?  Yes Feelings of regret, resentment or anger           Yes Donated organ may not function in recipient  Yes Changes in body image            Yes Potential for depression, anxiety, emotional distress or grief  Yes You and / or the recipient may have complications from the surgery      Yes Does the patient understand the psychological risks to the recipient?  Potential psychological risks for the recipients include possible changes in body image, depression, or anxiety after the surgery or a change in lifestyle.   Emotional distress or grief may occur and this could be related to any of the following possible situations: delayed kidney function, rejection, or failure of the transplanted  kidney in the recipient.   In some cases (2-4%), the recipient needs some dialysis after the transplant until the kidney works well.   The chance of the donated kidney failing in the first year after the transplant is about 5%. These situations may or may not require another transplant for the recipient.   There is a possibility of the recipient having their original disease return in the new kidney.   There is also a possibility of recipient death during or after the surgery.      Substance Use /Abuse History:    Current Tobacco User No  Patient uses   Tobacco Frequency                         For How Long  Is Patient Required to Quit     Former Tobacco User Yes  Describe past tobacco use and date quit    Pt reported he last smoked cigarettes and chewed Tobacco in 2015. Pt reported he would smoke 1 PPD of cigarettes. Pt reported he would Chew Tobacco 1 can per week. Pt reported he smoked  cigarettes for 4 yrs at this pattern. Pt reported he chewed tobacco for 1 and half years at this pattern.     Current Alcohol User Yes  Type of Alcohol Used     Beer     Amount 2-3 beers in a sitting      Frequency 1 time weekly  Pattern of Alcohol Use 12 years    Is Patient Required to Quit No  Continued to use the substance despite being told the substance is affecting their health    History of problems at work, school or home due to substance use      Former Alcohol User Yes  Describe past alcohol use and date quit    Pt reported his heaviest use was in 2006 or 2007. Pt reported he would have 5 beers in a sitting. Pt reported he would drink on the weekends at this pattern until 2012. Pt denied receiving any past DUI's.     Has patient ever gone to CD treatment No  If yes, When, Where and What type of Program  Attends AA meetings Never  Sponsor  Do support people drink alcohol   If yes, describe support people's use  Is alcohol kept in the home   Does Patient need to sign a CD contract No    Current Illegal / Unprescribed Drug  User No  Type of Illegal Drug Used                              Frequency  Pattern of Drug Use    Is Patient Required to Quit     Illegal / Unprescribed Drug #2  Type of Illegal Drug Used                              Frequency  Pattern of Drug Use      Continue to use the substance despite being told the substance is affecting their health    History of problems at work, school or home due to substance use      Former Illegal / Unprescribed Drug User Yes  Describe past illegal drug use and date quit    Pt reported he last smoked marijuana in 2010. Pt reported he would smoke joints. Pt reported smoking 1 joint in a sitting. Pt reported he would smoke monthly. Pt reported he smoked marijuana at this duration for 1 year and a half. Pt denied any heavier use pattern. Pt denied any other illicit drug use history.      Has patient ever gone to CD treatment No  If yes, When, Where and What type of Program   Attends AA/NA  meetings       Is patient on a Methadone / Suboxone regiment No  Do support people use illegal drugs   If yes, describe support people's use  Are illegal drugs kept in the home   Does Patient need to sign a CD contract No    Illegal / Unprescribed Drug #2  Type of Illegal Drug Used                              Frequency  Pattern of Drug Use    Prescription Drug Abuse:  No Has patient experienced feelings of addiction  No Has patient experienced symptoms of withdrawal  No Has patient experienced any side effects? e.g.  hallucinations or delusions    Does Patient Meet the Criteria for Alcohol Use Disorder No          Diagnosis  Does Patient Meet [facility] guidelines No  Does Patient Meet the Criteria for Illegal Drug Use Disorder No   Diagnosis  Does Patient Meet [facility] guidelines Yes    [facility] Substance Relapse Risk Factors    Has not been abstinent for more than 3 months (outside a controlled environment).     Has not participated in at least 1 month (3 meetings a week for 4 weeks=12) of an active recovery  program (structured treatment program and/or documented 12 step meeting attendance with sponsor selection contact).     Has not participated in at least 3 month (3 meetings a week for 12 weeks=36) of an active recovery program (structured treatment program and/or documented 12 step meeting attendance with sponsor selection contact).     Does not have an adequate sober, stable social network to support the patient's commitment to abstinence both pre and  post- transplant.     Does not have insight into his/her past misuse/abuse.   Persistent desire or unsuccessful efforts to cut down or control use.     Continued to use substance despite being told that the use is affecting his/her health.     Has a psychiatric disorder and does not have adequate coping skills for dealing with stressors.     History of problems at work, school or home due to substance use.     Has had two or more failures with a structured rehabilitation program.     Has failed random toxicology screens during medical evaluation for transplant.     Meets criteria for multiple substance use disorders.     History of driving under the influence or other legal consequences of substance use.      DSM-5 Severity Factors:      The substance is often taken in larger amounts or over a longer period than was intended.     There is a persistent desire or unsuccessful effort to cut down or control use of the substance.     A great deal of time is spent in activities necessary to obtain the substance, use the substance, or recover from its effects.     Craving, or strong desire or urge to use the substance.     Recurrent use of the substance resulting in a failure to fulfill major role obligations at work, school, or home.     Continued use of the substance despite having persistent or recurrent social or interpersonal problems caused or exacerbated by the effects of its use.     Important social, occupational, or recreational activities are given up or reduced  "because of use of the substance.     Recurrent use of the substance in situations in which it is physically hazardous.     Use of the substance is continued despite knowledge of having a persistent or recurrent physical or psychological problem that is likely to have been caused or exacerbated by the substance.     Tolerance, as defined by either of the following:  A need for markedly increased amounts of the substance to achieve intoxication or desired effect.  A markedly diminished effect with continued use of the same amount of the substance.     Withdrawal, as manifested by either of the following:  The characteristic withdrawal symptom for that substance (as specified in the DSM-V for each substance).  The substance (or closely related substance) taken to relieve or avoid withdrawal symptoms.      Legal Issues:  No Arrests  No Currently probation or parole                 MCC No                   When                    How long                            Where    Citizenship:  Yes [place] Citizen                    Green Card        Visa    Advance Directives: No  HCPOA   Living Will Conor is Proxy    Guardian:    Impression:    SW met with Pt for initial transplant-donor psychosocial assessment. Pt was pleasant and engaged. Pt reported he is hoping to donate to “his   friend's sister Nyasia”. Pt reported his primary motivation to donate is \"Being good friends with recipient's brother who passed away, I thought this would be a nice thing to do for her\". Pt also reported \"he has thought about being a donor in the past\". Pt reported he is donating of his own free will. Pt reports understanding that his information will be kept confidential from recipient. Pt reports he is not being pressured to donate. Pt reports he is not receiving anything in exchange for donation. Pt reports he understands his is able to change his mind at any time. SW completed/reviewed increased risk behaviors " "over the past 3 months and Pt  has not engaged in any increased risk behaviors. Pt reported denied any current work or financial concerns. Pt reported he is \"self-employed\". Pt denied any utilization of any PTO during recover. Pt reported primary support his wife Fabi and secondary support his JEAN MARIE Cory. SW to call and confirm supports. Pt reported his mood is \"Okay\".  Pt reported he was diagnosed with anxiety and PTSD in 2008 after serving in the [employer]. Pt denied taking any current use of MH medications. Pt reported in the past he has taken  \"Lexapro, Bupropion, and Seroquel\". Pt reported he found MH medications \"helpful\". Pt reported \"he stopped taking MH medications in 2022\". Pt reported he [facility]. Pt shared he finds current MH services \"helpful\". Pt denied any MH concerns at this time. PHQ-9 administered with score of 0, indicating no clinical depression. TRACIE-7 administered with score of 0, indicating no clinical anxiety. SW to refer to transplant psych for further MH evaluation. Pt denied current tobacco use. Pt reported past use of smoking cigarettes and chewing tobacco. Pt reported he last smoked cigarettes and chewed Tobacco in 2015. Pt reported he would smoke 1 PPD of cigarettes. Pt reported he would Chew 1 can of tobacco per week. Pt reported he smoked cigarettes for 4 years at this frequency. Pt reported he Chewed Tobacco for 1 and half years at this frequency. Pt reported current alcohol use. Pt reported he currently drinks beer, having 2-3 beers in a sitting. Pt reported he only drinks once a week on the weekends. Pt reported he has drank at this pattern for the last 12 years. Pt reported his heaviest use of alcohol was in 2006 or 2007. Pt reported he would drink 5 beers in a sitting. Pt reported he would drink beer every weekend at this pattern until 2012. Pt denied receiving any past DUI's. Pt denied any current illicit drug use. " Pt reported he last smoked marijuana in 2010. Pt reported he would smoke joints. Pt reported smoking 1 joint in the sitting. Pt reported he would smoke once a month. Pt reported he smoked marijuana at this duration for 1 year and half. Pt denied any heavier use pattern. Pt denied any other illicit drug use history. Pt is low psychosocial risk for donation.       Plan:  SW to refer Pt to transplant psych for further evaluation. SW will follow transplant psych recommendations. SW will call and confirm supports commitment. SW will follow up with Pt annually or post donation, which every ever comes first.

## 2024-08-29 ENCOUNTER — LAB REQUISITION (OUTPATIENT)
Dept: LAB | Facility: CLINIC | Age: 39
End: 2024-08-29
Payer: OTHER GOVERNMENT

## 2024-08-29 DIAGNOSIS — Z00.5 ENCOUNTER FOR EXAMINATION OF POTENTIAL DONOR OF ORGAN AND TISSUE: ICD-10-CM

## 2024-08-29 DIAGNOSIS — Z52.4 DONOR OF KIDNEY FOR TRANSPLANT: Primary | ICD-10-CM

## 2024-08-29 LAB
ATRIAL RATE: 59 BPM
CMV IGM SERPL-ACNC: <8 AU/ML
P AXIS: 11 DEGREES
P OFFSET: 183 MS
P ONSET: 136 MS
PATIENT PHENOTYPE: NORMAL
PR INTERVAL: 172 MS
Q ONSET: 222 MS
QRS COUNT: 10 BEATS
QRS DURATION: 82 MS
QT INTERVAL: 390 MS
QTC CALCULATION(BAZETT): 386 MS
QTC FREDERICIA: 388 MS
R AXIS: 22 DEGREES
T AXIS: 53 DEGREES
T OFFSET: 417 MS
VENTRICULAR RATE: 59 BPM

## 2024-08-29 PROCEDURE — 93005 ELECTROCARDIOGRAM TRACING: CPT

## 2024-08-30 LAB
NIL(NEG) CONTROL SPOT COUNT: NORMAL
PANEL A SPOT COUNT: NORMAL
PANEL B SPOT COUNT: NORMAL
POS CONTROL SPOT COUNT: NORMAL
T-SPOT. TB INTERPRETATION: NORMAL

## 2024-08-31 LAB
CYSTATIN C SERPL-MCNC: 1 MG/L (ref 0.5–1.2)
GFR/BSA.PRED SERPLBLD CYS-BASED-ARV: 85 ML/MIN/BSA
WNV IGG SER IA-ACNC: 0.25 IV
WNV IGM SER IA-ACNC: 0 IV

## 2024-09-05 DIAGNOSIS — Z52.4 DONOR OF KIDNEY FOR TRANSPLANT: Primary | ICD-10-CM

## 2024-09-07 ENCOUNTER — HOSPITAL ENCOUNTER (EMERGENCY)
Facility: HOSPITAL | Age: 39
Discharge: HOME | End: 2024-09-07
Attending: STUDENT IN AN ORGANIZED HEALTH CARE EDUCATION/TRAINING PROGRAM
Payer: OTHER GOVERNMENT

## 2024-09-07 ENCOUNTER — APPOINTMENT (OUTPATIENT)
Dept: RADIOLOGY | Facility: HOSPITAL | Age: 39
End: 2024-09-07
Payer: OTHER GOVERNMENT

## 2024-09-07 VITALS
HEART RATE: 70 BPM | RESPIRATION RATE: 16 BRPM | SYSTOLIC BLOOD PRESSURE: 148 MMHG | OXYGEN SATURATION: 99 % | HEIGHT: 70 IN | TEMPERATURE: 97.9 F | BODY MASS INDEX: 34.36 KG/M2 | DIASTOLIC BLOOD PRESSURE: 80 MMHG | WEIGHT: 240 LBS

## 2024-09-07 DIAGNOSIS — S22.080A COMPRESSION FRACTURE OF T12 VERTEBRA, INITIAL ENCOUNTER (MULTI): Primary | ICD-10-CM

## 2024-09-07 PROCEDURE — 72131 CT LUMBAR SPINE W/O DYE: CPT

## 2024-09-07 PROCEDURE — 99285 EMERGENCY DEPT VISIT HI MDM: CPT

## 2024-09-07 PROCEDURE — 72131 CT LUMBAR SPINE W/O DYE: CPT | Performed by: RADIOLOGY

## 2024-09-07 PROCEDURE — 72128 CT CHEST SPINE W/O DYE: CPT

## 2024-09-07 PROCEDURE — 72125 CT NECK SPINE W/O DYE: CPT

## 2024-09-07 PROCEDURE — 70450 CT HEAD/BRAIN W/O DYE: CPT | Performed by: RADIOLOGY

## 2024-09-07 PROCEDURE — 2500000001 HC RX 250 WO HCPCS SELF ADMINISTERED DRUGS (ALT 637 FOR MEDICARE OP): Performed by: STUDENT IN AN ORGANIZED HEALTH CARE EDUCATION/TRAINING PROGRAM

## 2024-09-07 PROCEDURE — 70450 CT HEAD/BRAIN W/O DYE: CPT

## 2024-09-07 PROCEDURE — 72125 CT NECK SPINE W/O DYE: CPT | Performed by: RADIOLOGY

## 2024-09-07 PROCEDURE — 72128 CT CHEST SPINE W/O DYE: CPT | Performed by: RADIOLOGY

## 2024-09-07 PROCEDURE — 99285 EMERGENCY DEPT VISIT HI MDM: CPT | Mod: 25

## 2024-09-07 RX ORDER — OXYCODONE HYDROCHLORIDE 5 MG/1
5 TABLET ORAL EVERY 8 HOURS PRN
Qty: 9 TABLET | Refills: 0 | Status: SHIPPED | OUTPATIENT
Start: 2024-09-07 | End: 2024-09-10

## 2024-09-07 RX ORDER — OXYCODONE HYDROCHLORIDE 5 MG/1
5 TABLET ORAL ONCE
Status: COMPLETED | OUTPATIENT
Start: 2024-09-07 | End: 2024-09-07

## 2024-09-07 RX ADMIN — OXYCODONE HYDROCHLORIDE 5 MG: 5 TABLET ORAL at 15:05

## 2024-09-07 ASSESSMENT — PAIN SCALES - GENERAL
PAINLEVEL_OUTOF10: 7
PAINLEVEL_OUTOF10: 7

## 2024-09-07 ASSESSMENT — PAIN DESCRIPTION - LOCATION: LOCATION: BACK

## 2024-09-07 ASSESSMENT — COLUMBIA-SUICIDE SEVERITY RATING SCALE - C-SSRS
2. HAVE YOU ACTUALLY HAD ANY THOUGHTS OF KILLING YOURSELF?: NO
1. IN THE PAST MONTH, HAVE YOU WISHED YOU WERE DEAD OR WISHED YOU COULD GO TO SLEEP AND NOT WAKE UP?: NO
6. HAVE YOU EVER DONE ANYTHING, STARTED TO DO ANYTHING, OR PREPARED TO DO ANYTHING TO END YOUR LIFE?: NO

## 2024-09-07 ASSESSMENT — PAIN - FUNCTIONAL ASSESSMENT: PAIN_FUNCTIONAL_ASSESSMENT: 0-10

## 2024-09-07 NOTE — ED PROVIDER NOTES
HPI   Chief Complaint   Patient presents with    Fall     12 ft fall from roof, did not hit head, landed on feet then to buttocks       This is a 39-year-old male who presents emergency department as a limited trauma.  He fell off the roof today he states that he hit his feet then his butt and his back he is complaining of some back pain.  No weakness numbness or tingling to not lose consciousness.  No other issues or concerns otherwise                          [place] Coma Scale Score: 15                  Patient History   No past medical history on file.  Past Surgical History:   Procedure Laterality Date    OTHER SURGICAL HISTORY  03/08/2021    Cholecystectomy    OTHER SURGICAL HISTORY  03/08/2021    Knee surgery    OTHER SURGICAL HISTORY  03/08/2021    Oral surgery     No family history on file.  Social History     Tobacco Use    Smoking status: Former     Types: Cigarettes     Start date: 2010    Smokeless tobacco: Not on file   Substance Use Topics    Alcohol use: Not on file    Drug use: Not on file       Physical Exam   ED Triage Vitals [09/07/24 1301]   Temperature Heart Rate Respirations BP   36.6 °C (97.9 °F) 67 16 147/79      Pulse Ox Temp src Heart Rate Source Patient Position   98 % -- Monitor Sitting      BP Location FiO2 (%)     Left arm --       Physical Exam  Constitutional:       General: He is not in acute distress.  HENT:      Head: Normocephalic and atraumatic.      Right Ear: Tympanic membrane normal.      Left Ear: Tympanic membrane normal.      Mouth/Throat:      Mouth: Mucous membranes are moist.   Eyes:      Extraocular Movements: Extraocular movements intact.      Conjunctiva/sclera: Conjunctivae normal.      Pupils: Pupils are equal, round, and reactive to light.   Cardiovascular:      Rate and Rhythm: Normal rate and regular rhythm.      Heart sounds: No murmur heard.  Pulmonary:      Effort: Pulmonary effort is normal. No respiratory distress.      Breath sounds: Normal breath sounds.  No stridor. No wheezing or rales.   Abdominal:      General: Bowel sounds are normal. There is no distension.      Tenderness: There is no abdominal tenderness. There is no guarding or rebound.   Musculoskeletal:         General: No swelling, tenderness or deformity. Normal range of motion.      Comments: Tenderness to palpation midline spine in the thoracic region.   Skin:     General: Skin is warm and dry.      Coloration: Skin is not jaundiced.      Findings: No bruising or lesion.   Neurological:      General: No focal deficit present.      Mental Status: He is alert and oriented to person, place, and time. Mental status is at baseline.      Cranial Nerves: No cranial nerve deficit.      Motor: No weakness.   Psychiatric:         Mood and Affect: Mood normal.       Labs Reviewed - No data to display  CT head wo IV contrast   Final Result   No evidence of acute cortical infarct or intracranial hemorrhage.        MACRO:   None             Signed by: Ryan Carranza 9/7/2024 1:54 PM   Dictation workstation:   [id number]      CT cervical spine wo IV contrast   Final Result   1.  No CT evidence of acute fracture.   2.  Degenerative changes throughout the cervical spine, as above.        Signed by: Ryan Carranza 9/7/2024 1:56 PM   Dictation workstation:   [id number]      CT thoracic spine wo IV contrast   Final Result   1. T12 compression fracture, as above.   2. Degenerative changes, as described above.   3.        MACRO:   None.        Signed by: Ryan Carranza 9/7/2024 2:03 PM   Dictation workstation:   [id number]      CT lumbar spine wo IV contrast   Final Result   1. T12 compression fracture, as above.   2. Degenerative changes, as described above.   3.        MACRO:   None.        Signed by: Ryan Carranza 9/7/2024 2:03 PM   Dictation workstation:   [id number]          ED Course & MDM   ED Course as of 09/07/24 1421   Sat Sep 07, 2024   1410 IMPRESSION:  1. T12 compression fracture, as above.  2. Degenerative  changes, as described above.  3.   [CF]      ED Course User Index  [CF] Arlene Washburn MD       Medical Decision Making  This is a 39-year-old male who presents emergency department after falling from a roof.  Patient states that he first hit his feet and then his butt and then his back but is only complaining of back pain.  No focal logical deficits no issues with urination or bowel movements.  CT of his spine and head showed a T12 compression fracture but there is no retropulsion and again he has no focal logical deficits.  Patient is able to ambulate and he has no complaints in his feet or his heels therefore an x-ray did not obtain.  I did speak with Dr. Adams with surgery who states that patient is also safe for discharge home.  Will give patient pain medication and have him follow-up with spine.  He verbalized his return precautions        Procedure  Procedures     Arlene Washburn MD  09/07/24 8218

## 2024-09-09 ENCOUNTER — APPOINTMENT (OUTPATIENT)
Dept: RADIOLOGY | Facility: HOSPITAL | Age: 39
End: 2024-09-09
Payer: OTHER GOVERNMENT

## 2024-09-09 ENCOUNTER — HOSPITAL ENCOUNTER (OUTPATIENT)
Facility: HOSPITAL | Age: 39
Setting detail: OBSERVATION
Discharge: HOME | End: 2024-09-11
Attending: EMERGENCY MEDICINE | Admitting: INTERNAL MEDICINE
Payer: OTHER GOVERNMENT

## 2024-09-09 DIAGNOSIS — S22.080A COMPRESSION FRACTURE OF T12 VERTEBRA, INITIAL ENCOUNTER (MULTI): Primary | ICD-10-CM

## 2024-09-09 DIAGNOSIS — R52 INTRACTABLE PAIN: ICD-10-CM

## 2024-09-09 PROBLEM — F32.A DEPRESSION: Status: ACTIVE | Noted: 2024-09-09

## 2024-09-09 PROBLEM — M54.50 LOW BACK PAIN, UNSPECIFIED: Status: ACTIVE | Noted: 2024-09-09

## 2024-09-09 PROBLEM — F41.9 ANXIETY: Status: ACTIVE | Noted: 2024-09-09

## 2024-09-09 PROBLEM — Z90.49 S/P LAPAROSCOPIC CHOLECYSTECTOMY: Status: ACTIVE | Noted: 2024-09-09

## 2024-09-09 PROBLEM — G47.33 OBSTRUCTIVE SLEEP APNEA (ADULT) (PEDIATRIC): Status: ACTIVE | Noted: 2024-09-09

## 2024-09-09 PROBLEM — E78.5 HYPERLIPIDEMIA, UNSPECIFIED: Status: ACTIVE | Noted: 2024-09-09

## 2024-09-09 PROBLEM — F43.10 POST-TRAUMATIC STRESS DISORDER, UNSPECIFIED: Status: ACTIVE | Noted: 2024-09-09

## 2024-09-09 LAB
ALBUMIN SERPL BCP-MCNC: 4.6 G/DL (ref 3.4–5)
ALP SERPL-CCNC: 43 U/L (ref 33–120)
ALT SERPL W P-5'-P-CCNC: 23 U/L (ref 10–52)
ANION GAP SERPL CALC-SCNC: 8 MMOL/L (ref 10–20)
APPEARANCE UR: CLEAR
AST SERPL W P-5'-P-CCNC: 34 U/L (ref 9–39)
BASOPHILS # BLD AUTO: 0.04 X10*3/UL (ref 0–0.1)
BASOPHILS NFR BLD AUTO: 0.8 %
BILIRUB SERPL-MCNC: 0.5 MG/DL (ref 0–1.2)
BILIRUB UR STRIP.AUTO-MCNC: NEGATIVE MG/DL
BUN SERPL-MCNC: 16 MG/DL (ref 6–23)
CALCIUM SERPL-MCNC: 8.6 MG/DL (ref 8.6–10.3)
CHLORIDE SERPL-SCNC: 106 MMOL/L (ref 98–107)
CO2 SERPL-SCNC: 27 MMOL/L (ref 21–32)
COLOR UR: YELLOW
CREAT SERPL-MCNC: 1.07 MG/DL (ref 0.5–1.3)
EGFRCR SERPLBLD CKD-EPI 2021: >90 ML/MIN/1.73M*2
EOSINOPHIL # BLD AUTO: 0.05 X10*3/UL (ref 0–0.7)
EOSINOPHIL NFR BLD AUTO: 1 %
ERYTHROCYTE [DISTWIDTH] IN BLOOD BY AUTOMATED COUNT: 13.1 % (ref 11.5–14.5)
GLUCOSE SERPL-MCNC: 99 MG/DL (ref 74–99)
GLUCOSE UR STRIP.AUTO-MCNC: NORMAL MG/DL
HCT VFR BLD AUTO: 45.1 % (ref 41–52)
HGB BLD-MCNC: 15.5 G/DL (ref 13.5–17.5)
IMM GRANULOCYTES # BLD AUTO: 0.01 X10*3/UL (ref 0–0.7)
IMM GRANULOCYTES NFR BLD AUTO: 0.2 % (ref 0–0.9)
KETONES UR STRIP.AUTO-MCNC: NEGATIVE MG/DL
LEUKOCYTE ESTERASE UR QL STRIP.AUTO: NEGATIVE
LYMPHOCYTES # BLD AUTO: 1.56 X10*3/UL (ref 1.2–4.8)
LYMPHOCYTES NFR BLD AUTO: 30 %
MCH RBC QN AUTO: 30.4 PG (ref 26–34)
MCHC RBC AUTO-ENTMCNC: 34.4 G/DL (ref 32–36)
MCV RBC AUTO: 88 FL (ref 80–100)
MONOCYTES # BLD AUTO: 0.34 X10*3/UL (ref 0.1–1)
MONOCYTES NFR BLD AUTO: 6.5 %
MUCOUS THREADS #/AREA URNS AUTO: NORMAL /LPF
NEUTROPHILS # BLD AUTO: 3.2 X10*3/UL (ref 1.2–7.7)
NEUTROPHILS NFR BLD AUTO: 61.5 %
NITRITE UR QL STRIP.AUTO: NEGATIVE
NRBC BLD-RTO: 0 /100 WBCS (ref 0–0)
PH UR STRIP.AUTO: 7.5 [PH]
PLATELET # BLD AUTO: 227 X10*3/UL (ref 150–450)
POTASSIUM SERPL-SCNC: 4.7 MMOL/L (ref 3.5–5.3)
PROT SERPL-MCNC: 7.5 G/DL (ref 6.4–8.2)
PROT UR STRIP.AUTO-MCNC: ABNORMAL MG/DL
RBC # BLD AUTO: 5.1 X10*6/UL (ref 4.5–5.9)
RBC # UR STRIP.AUTO: NEGATIVE /UL
RBC #/AREA URNS AUTO: NORMAL /HPF
SODIUM SERPL-SCNC: 136 MMOL/L (ref 136–145)
SP GR UR STRIP.AUTO: 1.03
UROBILINOGEN UR STRIP.AUTO-MCNC: ABNORMAL MG/DL
WBC # BLD AUTO: 5.2 X10*3/UL (ref 4.4–11.3)
WBC #/AREA URNS AUTO: NORMAL /HPF

## 2024-09-09 PROCEDURE — 85025 COMPLETE CBC W/AUTO DIFF WBC: CPT | Performed by: NURSE PRACTITIONER

## 2024-09-09 PROCEDURE — 2500000001 HC RX 250 WO HCPCS SELF ADMINISTERED DRUGS (ALT 637 FOR MEDICARE OP)

## 2024-09-09 PROCEDURE — 96374 THER/PROPH/DIAG INJ IV PUSH: CPT | Mod: 59

## 2024-09-09 PROCEDURE — A9575 INJ GADOTERATE MEGLUMI 0.1ML: HCPCS | Performed by: EMERGENCY MEDICINE

## 2024-09-09 PROCEDURE — 99285 EMERGENCY DEPT VISIT HI MDM: CPT | Mod: 25

## 2024-09-09 PROCEDURE — 96376 TX/PRO/DX INJ SAME DRUG ADON: CPT | Mod: 59

## 2024-09-09 PROCEDURE — 2550000001 HC RX 255 CONTRASTS: Performed by: EMERGENCY MEDICINE

## 2024-09-09 PROCEDURE — 72148 MRI LUMBAR SPINE W/O DYE: CPT

## 2024-09-09 PROCEDURE — 81003 URINALYSIS AUTO W/O SCOPE: CPT | Performed by: NURSE PRACTITIONER

## 2024-09-09 PROCEDURE — 72158 MRI LUMBAR SPINE W/O & W/DYE: CPT

## 2024-09-09 PROCEDURE — 36415 COLL VENOUS BLD VENIPUNCTURE: CPT | Performed by: NURSE PRACTITIONER

## 2024-09-09 PROCEDURE — 96375 TX/PRO/DX INJ NEW DRUG ADDON: CPT | Mod: 59

## 2024-09-09 PROCEDURE — 81001 URINALYSIS AUTO W/SCOPE: CPT | Performed by: NURSE PRACTITIONER

## 2024-09-09 PROCEDURE — 2500000004 HC RX 250 GENERAL PHARMACY W/ HCPCS (ALT 636 FOR OP/ED): Performed by: NURSE PRACTITIONER

## 2024-09-09 PROCEDURE — 99223 1ST HOSP IP/OBS HIGH 75: CPT

## 2024-09-09 PROCEDURE — 80053 COMPREHEN METABOLIC PANEL: CPT | Performed by: NURSE PRACTITIONER

## 2024-09-09 RX ORDER — BISACODYL 10 MG/1
10 SUPPOSITORY RECTAL DAILY PRN
Status: DISCONTINUED | OUTPATIENT
Start: 2024-09-09 | End: 2024-09-11 | Stop reason: HOSPADM

## 2024-09-09 RX ORDER — ESCITALOPRAM OXALATE 20 MG/1
1 TABLET ORAL DAILY
COMMUNITY
Start: 2024-02-06 | End: 2025-02-06

## 2024-09-09 RX ORDER — BUPROPION HYDROCHLORIDE 300 MG/1
300 TABLET ORAL EVERY MORNING
COMMUNITY
Start: 2024-02-06

## 2024-09-09 RX ORDER — GUAIFENESIN 600 MG/1
600 TABLET, EXTENDED RELEASE ORAL EVERY 12 HOURS PRN
Status: DISCONTINUED | OUTPATIENT
Start: 2024-09-09 | End: 2024-09-11 | Stop reason: HOSPADM

## 2024-09-09 RX ORDER — OXYCODONE HYDROCHLORIDE 5 MG/1
5 TABLET ORAL EVERY 4 HOURS PRN
Status: DISCONTINUED | OUTPATIENT
Start: 2024-09-09 | End: 2024-09-11 | Stop reason: HOSPADM

## 2024-09-09 RX ORDER — GADOTERATE MEGLUMINE 376.9 MG/ML
20 INJECTION INTRAVENOUS
Status: COMPLETED | OUTPATIENT
Start: 2024-09-09 | End: 2024-09-09

## 2024-09-09 RX ORDER — OXYCODONE HYDROCHLORIDE 10 MG/1
10 TABLET ORAL EVERY 4 HOURS PRN
Status: DISCONTINUED | OUTPATIENT
Start: 2024-09-09 | End: 2024-09-11 | Stop reason: HOSPADM

## 2024-09-09 RX ORDER — PANTOPRAZOLE SODIUM 40 MG/1
40 TABLET, DELAYED RELEASE ORAL
Status: DISCONTINUED | OUTPATIENT
Start: 2024-09-10 | End: 2024-09-11 | Stop reason: HOSPADM

## 2024-09-09 RX ORDER — KETOROLAC TROMETHAMINE 30 MG/ML
15 INJECTION, SOLUTION INTRAMUSCULAR; INTRAVENOUS ONCE
Status: COMPLETED | OUTPATIENT
Start: 2024-09-09 | End: 2024-09-09

## 2024-09-09 RX ORDER — ENOXAPARIN SODIUM 100 MG/ML
40 INJECTION SUBCUTANEOUS EVERY 24 HOURS
Status: DISCONTINUED | OUTPATIENT
Start: 2024-09-09 | End: 2024-09-11 | Stop reason: HOSPADM

## 2024-09-09 RX ORDER — ONDANSETRON HYDROCHLORIDE 2 MG/ML
4 INJECTION, SOLUTION INTRAVENOUS ONCE
Status: COMPLETED | OUTPATIENT
Start: 2024-09-09 | End: 2024-09-09

## 2024-09-09 RX ORDER — POLYETHYLENE GLYCOL 3350 17 G/17G
17 POWDER, FOR SOLUTION ORAL DAILY
Status: DISCONTINUED | OUTPATIENT
Start: 2024-09-10 | End: 2024-09-11 | Stop reason: HOSPADM

## 2024-09-09 RX ORDER — ONDANSETRON HYDROCHLORIDE 2 MG/ML
4 INJECTION, SOLUTION INTRAVENOUS EVERY 6 HOURS PRN
Status: DISCONTINUED | OUTPATIENT
Start: 2024-09-09 | End: 2024-09-11 | Stop reason: HOSPADM

## 2024-09-09 RX ORDER — PANTOPRAZOLE SODIUM 40 MG/10ML
40 INJECTION, POWDER, LYOPHILIZED, FOR SOLUTION INTRAVENOUS
Status: DISCONTINUED | OUTPATIENT
Start: 2024-09-10 | End: 2024-09-11 | Stop reason: HOSPADM

## 2024-09-09 RX ORDER — MORPHINE SULFATE 4 MG/ML
4 INJECTION INTRAVENOUS ONCE
Status: COMPLETED | OUTPATIENT
Start: 2024-09-09 | End: 2024-09-09

## 2024-09-09 RX ORDER — ACETAMINOPHEN 325 MG/1
650 TABLET ORAL EVERY 4 HOURS PRN
Status: DISCONTINUED | OUTPATIENT
Start: 2024-09-09 | End: 2024-09-11 | Stop reason: HOSPADM

## 2024-09-09 RX ORDER — BISACODYL 5 MG
10 TABLET, DELAYED RELEASE (ENTERIC COATED) ORAL DAILY PRN
Status: DISCONTINUED | OUTPATIENT
Start: 2024-09-09 | End: 2024-09-11 | Stop reason: HOSPADM

## 2024-09-09 RX ORDER — TALC
3 POWDER (GRAM) TOPICAL NIGHTLY PRN
Status: DISCONTINUED | OUTPATIENT
Start: 2024-09-09 | End: 2024-09-11 | Stop reason: HOSPADM

## 2024-09-09 RX ADMIN — KETOROLAC TROMETHAMINE 15 MG: 30 INJECTION, SOLUTION INTRAMUSCULAR; INTRAVENOUS at 16:19

## 2024-09-09 RX ADMIN — OXYCODONE HYDROCHLORIDE 5 MG: 5 TABLET ORAL at 22:48

## 2024-09-09 RX ADMIN — ONDANSETRON 4 MG: 2 INJECTION INTRAMUSCULAR; INTRAVENOUS at 10:50

## 2024-09-09 RX ADMIN — HYDROMORPHONE HYDROCHLORIDE 0.5 MG: 0.5 INJECTION, SOLUTION INTRAMUSCULAR; INTRAVENOUS; SUBCUTANEOUS at 18:07

## 2024-09-09 RX ADMIN — MORPHINE SULFATE 4 MG: 4 INJECTION INTRAVENOUS at 11:53

## 2024-09-09 RX ADMIN — HYDROMORPHONE HYDROCHLORIDE 0.5 MG: 0.5 INJECTION, SOLUTION INTRAMUSCULAR; INTRAVENOUS; SUBCUTANEOUS at 14:08

## 2024-09-09 RX ADMIN — GADOTERATE MEGLUMINE 20 ML: 376.9 INJECTION INTRAVENOUS at 18:46

## 2024-09-09 RX ADMIN — MORPHINE SULFATE 4 MG: 4 INJECTION INTRAVENOUS at 10:50

## 2024-09-09 ASSESSMENT — PAIN DESCRIPTION - LOCATION
LOCATION: BACK
LOCATION: BACK

## 2024-09-09 ASSESSMENT — LIFESTYLE VARIABLES
HAVE YOU EVER FELT YOU SHOULD CUT DOWN ON YOUR DRINKING: NO
HAVE PEOPLE ANNOYED YOU BY CRITICIZING YOUR DRINKING: NO
EVER FELT BAD OR GUILTY ABOUT YOUR DRINKING: NO
TOTAL SCORE: 0
EVER HAD A DRINK FIRST THING IN THE MORNING TO STEADY YOUR NERVES TO GET RID OF A HANGOVER: NO

## 2024-09-09 ASSESSMENT — PAIN SCALES - GENERAL
PAINLEVEL_OUTOF10: 8
PAINLEVEL_OUTOF10: 5 - MODERATE PAIN
PAINLEVEL_OUTOF10: 7
PAINLEVEL_OUTOF10: 5 - MODERATE PAIN
PAINLEVEL_OUTOF10: 8
PAINLEVEL_OUTOF10: 9

## 2024-09-09 ASSESSMENT — COLUMBIA-SUICIDE SEVERITY RATING SCALE - C-SSRS
1. IN THE PAST MONTH, HAVE YOU WISHED YOU WERE DEAD OR WISHED YOU COULD GO TO SLEEP AND NOT WAKE UP?: NO
6. HAVE YOU EVER DONE ANYTHING, STARTED TO DO ANYTHING, OR PREPARED TO DO ANYTHING TO END YOUR LIFE?: NO
2. HAVE YOU ACTUALLY HAD ANY THOUGHTS OF KILLING YOURSELF?: NO

## 2024-09-09 ASSESSMENT — PAIN DESCRIPTION - DESCRIPTORS: DESCRIPTORS: SHARP

## 2024-09-09 ASSESSMENT — PAIN DESCRIPTION - PAIN TYPE: TYPE: ACUTE PAIN

## 2024-09-09 ASSESSMENT — PAIN - FUNCTIONAL ASSESSMENT
PAIN_FUNCTIONAL_ASSESSMENT: 0-10
PAIN_FUNCTIONAL_ASSESSMENT: 0-10

## 2024-09-09 ASSESSMENT — PAIN DESCRIPTION - FREQUENCY: FREQUENCY: CONSTANT/CONTINUOUS

## 2024-09-09 NOTE — ED PROVIDER NOTES
HPI   Chief Complaint   Patient presents with    back pain       39-year-old male presents the emergency department today for worsening back pain.  Patient states that he was seen at our facility on 7 September after a fall.  He was a limited trauma at that time as he fell off the roof landing on his feet and then to his butt and back.  He has been having worsening back pain since that time.  He has been taking the oxycodone as prescribed without improvement.  He is unable to get out of the bed due to his pain.  No weakness in the lower extremities however he is having right lower extremity numbness and tingling.  Denies any bowel or bladder incontinence.  No dizziness, headache, vision changes, nausea or vomiting.  Denies any chest pain or shortness of breath.                          [place] Coma Scale Score: 15                  Patient History   No past medical history on file.  Past Surgical History:   Procedure Laterality Date    OTHER SURGICAL HISTORY  03/08/2021    Cholecystectomy    OTHER SURGICAL HISTORY  03/08/2021    Knee surgery    OTHER SURGICAL HISTORY  03/08/2021    Oral surgery     No family history on file.  Social History     Tobacco Use    Smoking status: Former     Types: Cigarettes     Start date: 2010    Smokeless tobacco: Not on file   Substance Use Topics    Alcohol use: Not on file    Drug use: Not on file       Physical Exam   ED Triage Vitals [09/09/24 0953]   Temperature Heart Rate Respirations BP   (!) 3 °C (37.4 °F) 82 16 (!) 141/92      Pulse Ox Temp Source Heart Rate Source Patient Position   98 % Temporal Monitor Sitting      BP Location FiO2 (%)     Left arm --       Physical Exam  Vitals and nursing note reviewed.   Constitutional:       General: He is not in acute distress.     Appearance: Normal appearance. He is not toxic-appearing.   HENT:      Right Ear: Tympanic membrane normal.      Left Ear: Tympanic membrane normal.      Mouth/Throat:      Mouth: Mucous membranes are  moist.      Pharynx: Oropharynx is clear.   Eyes:      Extraocular Movements: Extraocular movements intact.      Pupils: Pupils are equal, round, and reactive to light.   Cardiovascular:      Rate and Rhythm: Normal rate and regular rhythm.      Pulses: Normal pulses.      Heart sounds: Normal heart sounds.   Pulmonary:      Effort: Pulmonary effort is normal.      Breath sounds: Normal breath sounds.   Abdominal:      General: Abdomen is flat. Bowel sounds are normal.      Palpations: Abdomen is soft.      Tenderness: There is no abdominal tenderness.   Musculoskeletal:      Cervical back: Normal, normal range of motion and neck supple.      Thoracic back: Tenderness and bony tenderness present. Decreased range of motion.      Lumbar back: Normal.   Skin:     General: Skin is warm and dry.      Capillary Refill: Capillary refill takes less than 2 seconds.   Neurological:      General: No focal deficit present.      Mental Status: He is alert and oriented to person, place, and time.      Sensory: No sensory deficit.      Motor: No weakness.      Gait: Gait abnormal.   Psychiatric:         Mood and Affect: Mood normal.         Behavior: Behavior normal.         Judgment: Judgment normal.         Labs Reviewed   COMPREHENSIVE METABOLIC PANEL - Abnormal       Result Value    Glucose 99      Sodium 136      Potassium 4.7      Chloride 106      Bicarbonate 27      Anion Gap 8 (*)     Urea Nitrogen 16      Creatinine 1.07      eGFR >90      Calcium 8.6      Albumin 4.6      Alkaline Phosphatase 43      Total Protein 7.5      AST 34      Bilirubin, Total 0.5      ALT 23     URINALYSIS WITH REFLEX MICROSCOPIC - Abnormal    Color, Urine Yellow      Appearance, Urine Clear      Specific Gravity, Urine 1.029      pH, Urine 7.5      Protein, Urine 10 (TRACE)      Glucose, Urine Normal      Blood, Urine NEGATIVE      Ketones, Urine NEGATIVE      Bilirubin, Urine NEGATIVE      Urobilinogen, Urine 3 (1+) (*)     Nitrite, Urine  NEGATIVE      Leukocyte Esterase, Urine NEGATIVE     CBC WITH AUTO DIFFERENTIAL    WBC 5.2      nRBC 0.0      RBC 5.10      Hemoglobin 15.5      Hematocrit 45.1      MCV 88      MCH 30.4      MCHC 34.4      RDW 13.1      Platelets 227      Neutrophils % 61.5      Immature Granulocytes %, Automated 0.2      Lymphocytes % 30.0      Monocytes % 6.5      Eosinophils % 1.0      Basophils % 0.8      Neutrophils Absolute 3.20      Immature Granulocytes Absolute, Automated 0.01      Lymphocytes Absolute 1.56      Monocytes Absolute 0.34      Eosinophils Absolute 0.05      Basophils Absolute 0.04     MICROSCOPIC ONLY, URINE    WBC, Urine 1-5      RBC, Urine 3-5      Mucus, Urine FEW       Pain Management Panel           No data to display              MR lumbar spine wo IV contrast   Final Result   Acute to early subacute traumatic compression fracture of the   superior endplate of T12 with aproximally 35% height loss. Mild   associated prevertebral and paravertebral soft tissue edema. No   significant canal stenosis or epidural hematoma.        Signed by: Colton Orozco 9/9/2024 7:32 PM   Dictation workstation:   [id number]          ED Course & [facility]   ED Course as of 09/09/24 2055   Mon Sep 09, 2024   1406 Patient is having severe pain at this time on reevaluation after he was moved from the bed to a recliner.  I did order a dose of Dilaudid at this point.  MRI is still 2 hours out. [RB]   1954 Patient's MRI has resulted which shows acute to early subacute traumatic compression fracture of the superior endplate of T12 with approximately 35% height loss with mild associated prevertebral and paravertebral soft tissue edema.  No significant canal stenosis or epidural hematoma.  I am reaching out to orthospine at this time to discuss patient case to see if they are okay with admission to our facility for pain control [RB]      ED Course User Index  [RB] Mary Alice Stanton, APRN-CNP         Diagnoses as of 09/09/24 2055    Compression fracture of T12 vertebra, initial encounter (Multi)   Intractable pain       Medical Decision Making  On initial evaluation I did review patient's most recent emergency department visit where is found to have a T12 fracture.  No signs of cauda equina at this time.  He is having some numbness and tingling down the right lower extremity.  He was given a dose of morphine and Zofran in the ER for his pain and MRI of the T-spine and L-spine were ordered.  Patient continued have pain despite 4 morphine was given additional 4.  ED RN CMP and CBC are within normal limits.  MRI shows acute to early subacute traumatic compression fracture.  A plate of T12 with 35% height loss mild associated prevertebral and paravertebral to soft tissue swelling but no significant canal stenosis or epidural hematoma.  He did require Dilaudid and Toradol for his pain also.  [place]ached [place] to [place]surgery [place] state patient is okay for pain control admission to our facility.  Spoke with [facility] who is agreeable to the plan.  Will continue monitor patient in the ED until he is transferred to the floor.        Procedure  Procedures       Mary Alice Stanton, SPENCER-ROSMERY  09/09/24 2056

## 2024-09-10 LAB
ANION GAP SERPL CALC-SCNC: 8 MMOL/L (ref 10–20)
BASOPHILS # BLD AUTO: 0.04 X10*3/UL (ref 0–0.1)
BASOPHILS NFR BLD AUTO: 0.8 %
BUN SERPL-MCNC: 18 MG/DL (ref 6–23)
CALCIUM SERPL-MCNC: 7.9 MG/DL (ref 8.6–10.3)
CHLORIDE SERPL-SCNC: 103 MMOL/L (ref 98–107)
CO2 SERPL-SCNC: 32 MMOL/L (ref 21–32)
CREAT SERPL-MCNC: 1.48 MG/DL (ref 0.5–1.3)
EGFRCR SERPLBLD CKD-EPI 2021: 61 ML/MIN/1.73M*2
EOSINOPHIL # BLD AUTO: 0.05 X10*3/UL (ref 0–0.7)
EOSINOPHIL NFR BLD AUTO: 1 %
ERYTHROCYTE [DISTWIDTH] IN BLOOD BY AUTOMATED COUNT: 12.9 % (ref 11.5–14.5)
ERYTHROCYTE [DISTWIDTH] IN BLOOD BY AUTOMATED COUNT: NORMAL %
GLUCOSE SERPL-MCNC: 94 MG/DL (ref 74–99)
HCT VFR BLD AUTO: 40.6 % (ref 41–52)
HCT VFR BLD AUTO: NORMAL %
HGB BLD-MCNC: 13.5 G/DL (ref 13.5–17.5)
HGB BLD-MCNC: NORMAL G/DL
HLA CLS I TYP PNL BLD/T DONR HIGH RES: NORMAL
HLA RESULTS: NORMAL
HLA-DP2 QL: NORMAL
HLA-DQB1 HIGH RES: NORMAL
HLA-DRB1 HIGH RES: NORMAL
IMM GRANULOCYTES # BLD AUTO: 0.02 X10*3/UL (ref 0–0.7)
IMM GRANULOCYTES NFR BLD AUTO: 0.4 % (ref 0–0.9)
LYMPHOCYTES # BLD AUTO: 1.45 X10*3/UL (ref 1.2–4.8)
LYMPHOCYTES NFR BLD AUTO: 29.2 %
MCH RBC QN AUTO: 30 PG (ref 26–34)
MCH RBC QN AUTO: NORMAL PG
MCHC RBC AUTO-ENTMCNC: 33.3 G/DL (ref 32–36)
MCHC RBC AUTO-ENTMCNC: NORMAL G/DL
MCV RBC AUTO: 90 FL (ref 80–100)
MCV RBC AUTO: NORMAL FL
MONOCYTES # BLD AUTO: 0.25 X10*3/UL (ref 0.1–1)
MONOCYTES NFR BLD AUTO: 5 %
NEUTROPHILS # BLD AUTO: 3.16 X10*3/UL (ref 1.2–7.7)
NEUTROPHILS NFR BLD AUTO: 63.6 %
NRBC BLD-RTO: 0 /100 WBCS (ref 0–0)
NRBC BLD-RTO: NORMAL /100{WBCS}
PLATELET # BLD AUTO: 179 X10*3/UL (ref 150–450)
PLATELET # BLD AUTO: NORMAL 10*3/UL
POTASSIUM SERPL-SCNC: 4.6 MMOL/L (ref 3.5–5.3)
RBC # BLD AUTO: 4.5 X10*6/UL (ref 4.5–5.9)
RBC # BLD AUTO: NORMAL 10*6/UL
SODIUM SERPL-SCNC: 138 MMOL/L (ref 136–145)
WBC # BLD AUTO: 5 X10*3/UL (ref 4.4–11.3)
WBC # BLD AUTO: NORMAL 10*3/UL

## 2024-09-10 PROCEDURE — 36415 COLL VENOUS BLD VENIPUNCTURE: CPT

## 2024-09-10 PROCEDURE — 2500000004 HC RX 250 GENERAL PHARMACY W/ HCPCS (ALT 636 FOR OP/ED)

## 2024-09-10 PROCEDURE — 80048 BASIC METABOLIC PNL TOTAL CA: CPT

## 2024-09-10 PROCEDURE — G0378 HOSPITAL OBSERVATION PER HR: HCPCS

## 2024-09-10 PROCEDURE — 99233 SBSQ HOSP IP/OBS HIGH 50: CPT | Performed by: INTERNAL MEDICINE

## 2024-09-10 PROCEDURE — 96376 TX/PRO/DX INJ SAME DRUG ADON: CPT

## 2024-09-10 PROCEDURE — 2500000001 HC RX 250 WO HCPCS SELF ADMINISTERED DRUGS (ALT 637 FOR MEDICARE OP)

## 2024-09-10 PROCEDURE — 85025 COMPLETE CBC W/AUTO DIFF WBC: CPT | Performed by: INTERNAL MEDICINE

## 2024-09-10 PROCEDURE — 2500000004 HC RX 250 GENERAL PHARMACY W/ HCPCS (ALT 636 FOR OP/ED): Performed by: INTERNAL MEDICINE

## 2024-09-10 PROCEDURE — 96372 THER/PROPH/DIAG INJ SC/IM: CPT

## 2024-09-10 PROCEDURE — 85027 COMPLETE CBC AUTOMATED: CPT

## 2024-09-10 RX ORDER — PRAZOSIN HYDROCHLORIDE 5 MG/1
5 CAPSULE ORAL NIGHTLY
COMMUNITY

## 2024-09-10 RX ORDER — ESCITALOPRAM OXALATE 10 MG/1
20 TABLET ORAL DAILY
Status: DISCONTINUED | OUTPATIENT
Start: 2024-09-10 | End: 2024-09-11 | Stop reason: HOSPADM

## 2024-09-10 RX ORDER — SODIUM CHLORIDE 9 MG/ML
125 INJECTION, SOLUTION INTRAVENOUS CONTINUOUS
Status: DISCONTINUED | OUTPATIENT
Start: 2024-09-10 | End: 2024-09-11 | Stop reason: HOSPADM

## 2024-09-10 RX ORDER — SIMVASTATIN 10 MG/1
10 TABLET, FILM COATED ORAL NIGHTLY
COMMUNITY

## 2024-09-10 RX ORDER — BUPROPION HYDROCHLORIDE 150 MG/1
300 TABLET ORAL EVERY MORNING
Status: DISCONTINUED | OUTPATIENT
Start: 2024-09-10 | End: 2024-09-11 | Stop reason: HOSPADM

## 2024-09-10 RX ORDER — DOXEPIN HYDROCHLORIDE 25 MG/1
25 CAPSULE ORAL NIGHTLY
COMMUNITY

## 2024-09-10 RX ADMIN — HYDROMORPHONE HYDROCHLORIDE 0.2 MG: 0.5 INJECTION, SOLUTION INTRAMUSCULAR; INTRAVENOUS; SUBCUTANEOUS at 08:53

## 2024-09-10 RX ADMIN — ACETAMINOPHEN 650 MG: 325 TABLET ORAL at 03:04

## 2024-09-10 RX ADMIN — HYDROMORPHONE HYDROCHLORIDE 0.2 MG: 0.5 INJECTION, SOLUTION INTRAMUSCULAR; INTRAVENOUS; SUBCUTANEOUS at 16:44

## 2024-09-10 RX ADMIN — SODIUM CHLORIDE 125 ML/HR: 9 INJECTION, SOLUTION INTRAVENOUS at 17:26

## 2024-09-10 RX ADMIN — BUPROPION HYDROCHLORIDE 300 MG: 150 TABLET, EXTENDED RELEASE ORAL at 08:55

## 2024-09-10 RX ADMIN — OXYCODONE HYDROCHLORIDE 10 MG: 10 TABLET ORAL at 03:03

## 2024-09-10 RX ADMIN — OXYCODONE HYDROCHLORIDE 10 MG: 10 TABLET ORAL at 20:38

## 2024-09-10 RX ADMIN — OXYCODONE HYDROCHLORIDE 10 MG: 10 TABLET ORAL at 13:58

## 2024-09-10 RX ADMIN — ENOXAPARIN SODIUM 40 MG: 40 INJECTION SUBCUTANEOUS at 20:37

## 2024-09-10 RX ADMIN — OXYCODONE HYDROCHLORIDE 10 MG: 10 TABLET ORAL at 07:20

## 2024-09-10 RX ADMIN — SODIUM CHLORIDE 125 ML/HR: 9 INJECTION, SOLUTION INTRAVENOUS at 08:47

## 2024-09-10 RX ADMIN — ONDANSETRON 4 MG: 2 INJECTION INTRAMUSCULAR; INTRAVENOUS at 13:58

## 2024-09-10 RX ADMIN — PANTOPRAZOLE SODIUM 40 MG: 40 TABLET, DELAYED RELEASE ORAL at 07:21

## 2024-09-10 RX ADMIN — POLYETHYLENE GLYCOL 3350 17 G: 17 POWDER, FOR SOLUTION ORAL at 08:50

## 2024-09-10 RX ADMIN — ESCITALOPRAM OXALATE 20 MG: 10 TABLET ORAL at 08:55

## 2024-09-10 SDOH — SOCIAL STABILITY: SOCIAL INSECURITY: DOES ANYONE TRY TO KEEP YOU FROM HAVING/CONTACTING OTHER FRIENDS OR DOING THINGS OUTSIDE YOUR HOME?: NO

## 2024-09-10 SDOH — SOCIAL STABILITY: SOCIAL INSECURITY: ABUSE: ADULT

## 2024-09-10 SDOH — SOCIAL STABILITY: SOCIAL INSECURITY: HAVE YOU HAD THOUGHTS OF HARMING ANYONE ELSE?: NO

## 2024-09-10 SDOH — SOCIAL STABILITY: SOCIAL INSECURITY: ARE YOU OR HAVE YOU BEEN THREATENED OR ABUSED PHYSICALLY, EMOTIONALLY, OR SEXUALLY BY ANYONE?: NO

## 2024-09-10 SDOH — SOCIAL STABILITY: SOCIAL INSECURITY: WERE YOU ABLE TO COMPLETE ALL THE BEHAVIORAL HEALTH SCREENINGS?: YES

## 2024-09-10 SDOH — SOCIAL STABILITY: SOCIAL INSECURITY: HAVE YOU HAD ANY THOUGHTS OF HARMING ANYONE ELSE?: NO

## 2024-09-10 SDOH — SOCIAL STABILITY: SOCIAL INSECURITY: ARE THERE ANY APPARENT SIGNS OF INJURIES/BEHAVIORS THAT COULD BE RELATED TO ABUSE/NEGLECT?: NO

## 2024-09-10 SDOH — SOCIAL STABILITY: SOCIAL INSECURITY: DO YOU FEEL ANYONE HAS EXPLOITED OR TAKEN ADVANTAGE OF YOU FINANCIALLY OR OF YOUR PERSONAL PROPERTY?: NO

## 2024-09-10 SDOH — SOCIAL STABILITY: SOCIAL INSECURITY: HAS ANYONE EVER THREATENED TO HURT YOUR FAMILY OR YOUR PETS?: NO

## 2024-09-10 SDOH — SOCIAL STABILITY: SOCIAL INSECURITY: DO YOU FEEL UNSAFE GOING BACK TO THE PLACE WHERE YOU ARE LIVING?: NO

## 2024-09-10 ASSESSMENT — PAIN SCALES - GENERAL
PAINLEVEL_OUTOF10: 6
PAINLEVEL_OUTOF10: 8
PAINLEVEL_OUTOF10: 4
PAINLEVEL_OUTOF10: 8
PAINLEVEL_OUTOF10: 7
PAINLEVEL_OUTOF10: 8
PAINLEVEL_OUTOF10: 5 - MODERATE PAIN
PAINLEVEL_OUTOF10: 7
PAINLEVEL_OUTOF10: 8
PAINLEVEL_OUTOF10: 7
PAINLEVEL_OUTOF10: 5 - MODERATE PAIN
PAINLEVEL_OUTOF10: 5 - MODERATE PAIN

## 2024-09-10 ASSESSMENT — PAIN DESCRIPTION - LOCATION
LOCATION: BACK
LOCATION: HEAD
LOCATION: BACK

## 2024-09-10 ASSESSMENT — ENCOUNTER SYMPTOMS
WOUND: 0
NUMBNESS: 1
FLANK PAIN: 0
FEVER: 0
WEAKNESS: 0
COUGH: 0
BACK PAIN: 1
CHILLS: 0
JOINT SWELLING: 0
VOMITING: 0
CONSTIPATION: 0
ABDOMINAL PAIN: 0
NAUSEA: 0
SHORTNESS OF BREATH: 0
HEADACHES: 0
FATIGUE: 0
DIARRHEA: 0
PALPITATIONS: 0
TREMORS: 0
HEMATURIA: 0
WHEEZING: 0
CHEST TIGHTNESS: 0

## 2024-09-10 ASSESSMENT — LIFESTYLE VARIABLES
HOW OFTEN DO YOU HAVE 6 OR MORE DRINKS ON ONE OCCASION: MONTHLY
AUDIT-C TOTAL SCORE: 3
SKIP TO QUESTIONS 9-10: 0
HOW MANY STANDARD DRINKS CONTAINING ALCOHOL DO YOU HAVE ON A TYPICAL DAY: 3 OR 4
AUDIT-C TOTAL SCORE: 3
HOW OFTEN DO YOU HAVE A DRINK CONTAINING ALCOHOL: NEVER

## 2024-09-10 ASSESSMENT — COGNITIVE AND FUNCTIONAL STATUS - GENERAL
CLIMB 3 TO 5 STEPS WITH RAILING: A LITTLE
MOBILITY SCORE: 22
WALKING IN HOSPITAL ROOM: A LITTLE
HELP NEEDED FOR BATHING: A LITTLE
DAILY ACTIVITIY SCORE: 21
DRESSING REGULAR LOWER BODY CLOTHING: A LITTLE
TOILETING: A LITTLE
PATIENT BASELINE BEDBOUND: NO

## 2024-09-10 ASSESSMENT — ACTIVITIES OF DAILY LIVING (ADL)
WALKS IN HOME: INDEPENDENT
BATHING: INDEPENDENT
HEARING - RIGHT EAR: FUNCTIONAL
ADEQUATE_TO_COMPLETE_ADL: YES
TOILETING: INDEPENDENT
PATIENT'S MEMORY ADEQUATE TO SAFELY COMPLETE DAILY ACTIVITIES?: YES
FEEDING YOURSELF: INDEPENDENT
LACK_OF_TRANSPORTATION: NO
HEARING - LEFT EAR: FUNCTIONAL
GROOMING: INDEPENDENT
DRESSING YOURSELF: INDEPENDENT
JUDGMENT_ADEQUATE_SAFELY_COMPLETE_DAILY_ACTIVITIES: YES

## 2024-09-10 ASSESSMENT — PAIN DESCRIPTION - ORIENTATION: ORIENTATION: MID

## 2024-09-10 ASSESSMENT — PAIN - FUNCTIONAL ASSESSMENT
PAIN_FUNCTIONAL_ASSESSMENT: 0-10

## 2024-09-10 ASSESSMENT — PATIENT HEALTH QUESTIONNAIRE - PHQ9
1. LITTLE INTEREST OR PLEASURE IN DOING THINGS: NOT AT ALL
2. FEELING DOWN, DEPRESSED OR HOPELESS: NOT AT ALL
SUM OF ALL RESPONSES TO PHQ9 QUESTIONS 1 & 2: 0

## 2024-09-10 ASSESSMENT — COLUMBIA-SUICIDE SEVERITY RATING SCALE - C-SSRS
6. HAVE YOU EVER DONE ANYTHING, STARTED TO DO ANYTHING, OR PREPARED TO DO ANYTHING TO END YOUR LIFE?: NO
2. HAVE YOU ACTUALLY HAD ANY THOUGHTS OF KILLING YOURSELF?: NO
1. IN THE PAST MONTH, HAVE YOU WISHED YOU WERE DEAD OR WISHED YOU COULD GO TO SLEEP AND NOT WAKE UP?: NO

## 2024-09-10 NOTE — H&P
[facility] - GENERAL MEDICINE HISTORY AND PHYSICAL    History Obtained From: Pt  Collateral History: Chart review    History Of Present Illness:  Dc Pastor is a 39 y.o. male with PMHx s/f PTSD presenting with back pain. He was seen in ED 2 days ago for his back pain after he slipped and fell from the roof (about 12 ft high), landed on his butt and CT showed T12 compression fracture. He was discharged home with oxycodone. He has been taking it regularly but the pain is not improving and now he has some numbness in his right upper leg. He reports the back pain is intermittent, worsens with movements and he couldn't walk/bare weights secondary to pain. Denies f/c/n/v, chest pain, sob, abd pain, saddle paresthesias, urinary retention, urine or bowel incontinence, leg swelling.      ED Course (Summary):   Vitals on presentation: 96.8F, 82 bpm, 16 RR, 141/92, 98% on RA  Labs: CMP unremarkable  CBC unremarkable  UA negative  Imaging: MR lumbar spine-Acute to early subacute traumatic compression fracture of the superior endplate of T12 with aproximally 35% height loss. Mild associated prevertebral and paravertebral soft tissue edema. No significant canal stenosis or epidural hematoma.  Interventions: Dilaudid 0.5 mg x 2, Toradol 15 mg, morphine 4 mg x 2, Zofran 4 mg, admission for further management    ED Course (From ED Provider):  ED Course as of 09/09/24 2351   Mon Sep 09, 2024   1406 Patient is having severe pain at this time on reevaluation after he was moved from the bed to a recliner.  I did order a dose of Dilaudid at this point.  MRI is still 2 hours out. [RB]   1954 Patient's MRI has resulted which shows acute to early subacute traumatic compression fracture of the superior endplate of T12 with approximately 35% height loss with mild associated prevertebral and paravertebral soft tissue edema.  No significant canal stenosis or epidural hematoma.  I am reaching out to orthospine at this time  to discuss patient case to see if they are okay with admission to our facility for pain control [RB]      ED Course User Index  [RB] Mary Alice BURKS Maximino, APRN-CNP         Diagnoses as of 09/09/24 2351   Compression fracture of T12 vertebra, initial encounter (Multi)   Intractable pain     Relevant Results  Results for orders placed or performed during the hospital encounter of 09/09/24 (from the past 24 hour(s))   CBC and Auto Differential   Result Value Ref Range    WBC 5.2 4.4 - 11.3 x10*3/uL    nRBC 0.0 0.0 - 0.0 /100 WBCs    RBC 5.10 4.50 - 5.90 x10*6/uL    Hemoglobin 15.5 13.5 - 17.5 g/dL    Hematocrit 45.1 41.0 - 52.0 %    MCV 88 80 - 100 fL    MCH 30.4 26.0 - 34.0 pg    MCHC 34.4 32.0 - 36.0 g/dL    RDW 13.1 11.5 - 14.5 %    Platelets 227 150 - 450 x10*3/uL    Neutrophils % 61.5 40.0 - 80.0 %    Immature Granulocytes %, Automated 0.2 0.0 - 0.9 %    Lymphocytes % 30.0 13.0 - 44.0 %    Monocytes % 6.5 2.0 - 10.0 %    Eosinophils % 1.0 0.0 - 6.0 %    Basophils % 0.8 0.0 - 2.0 %    Neutrophils Absolute 3.20 1.20 - 7.70 x10*3/uL    Immature Granulocytes Absolute, Automated 0.01 0.00 - 0.70 x10*3/uL    Lymphocytes Absolute 1.56 1.20 - 4.80 x10*3/uL    Monocytes Absolute 0.34 0.10 - 1.00 x10*3/uL    Eosinophils Absolute 0.05 0.00 - 0.70 x10*3/uL    Basophils Absolute 0.04 0.00 - 0.10 x10*3/uL   Comprehensive metabolic panel   Result Value Ref Range    Glucose 99 74 - 99 mg/dL    Sodium 136 136 - 145 mmol/L    Potassium 4.7 3.5 - 5.3 mmol/L    Chloride 106 98 - 107 mmol/L    Bicarbonate 27 21 - 32 mmol/L    Anion Gap 8 (L) 10 - 20 mmol/L    Urea Nitrogen 16 6 - 23 mg/dL    Creatinine 1.07 0.50 - 1.30 mg/dL    eGFR >90 >60 mL/min/1.73m*2    Calcium 8.6 8.6 - 10.3 mg/dL    Albumin 4.6 3.4 - 5.0 g/dL    Alkaline Phosphatase 43 33 - 120 U/L    Total Protein 7.5 6.4 - 8.2 g/dL    AST 34 9 - 39 U/L    Bilirubin, Total 0.5 0.0 - 1.2 mg/dL    ALT 23 10 - 52 U/L   Urinalysis with Reflex Microscopic   Result Value Ref Range     Color, Urine Yellow Light-Yellow, Yellow, Dark-Yellow    Appearance, Urine Clear Clear    Specific Gravity, Urine 1.029 1.005 - 1.035    pH, Urine 7.5 5.0, 5.5, 6.0, 6.5, 7.0, 7.5, 8.0    Protein, Urine 10 (TRACE) NEGATIVE, 10 (TRACE), 20 (TRACE) mg/dL    Glucose, Urine Normal Normal mg/dL    Blood, Urine NEGATIVE NEGATIVE    Ketones, Urine NEGATIVE NEGATIVE mg/dL    Bilirubin, Urine NEGATIVE NEGATIVE    Urobilinogen, Urine 3 (1+) (A) Normal mg/dL    Nitrite, Urine NEGATIVE NEGATIVE    Leukocyte Esterase, Urine NEGATIVE NEGATIVE   Microscopic Only, Urine   Result Value Ref Range    WBC, Urine 1-5 1-5, NONE /HPF    RBC, Urine 3-5 NONE, 1-2, 3-5 /HPF    Mucus, Urine FEW Reference range not established. /LPF      MR lumbar spine wo IV contrast    Result Date: 9/9/2024  Interpreted By:  Colton Orozco, STUDY: MR LUMBAR SPINE WO IV CONTRAST;  9/9/2024 7:09 pm   INDICATION: Signs/Symptoms:back pain.   COMPARISON: CT thoracic and lumbar spine dated 09/07/2024.   ACCESSION NUMBER(S): [id number]   ORDERING CLINICIAN: SAM GANDARA   TECHNIQUE: Sagittal T2, T1, and STIR and axial T1, T2 sequences of the lumbar spine. Fat-suppressed axial and sagittal postcontrast images were obtained after administration of 20 mL Dotarem intravenous contrast.   FINDINGS:   Lumbar spine: Normal lumbar lordosis. There is a traumatic fracture of the superior endplate of T12 vertebral body with aproximally 35% height loss. There is minimal retropulsion of the superior endplate of T12. There is associated bone marrow edema and enhancement as well as mild prevertebral and paravertebral edema and enhancement consistent with acute to early subacute fracture.   Disc space heights are maintained.   The conus medullaris terminates at L1 and is normal in appearance. No epidural hematoma or abnormal leptomeningeal enhancement.     T12-L1: No significant disc bulge or herniation. No central canal or foraminal stenosis. L1-S1: No significant disc  bulge or herniation. No central canal or foraminal stenosis.     Other: There is unchanged 3.4 cm left lower pole bilobed renal cyst.       Acute to early subacute traumatic compression fracture of the superior endplate of T12 with aproximally 35% height loss. Mild associated prevertebral and paravertebral soft tissue edema. No significant canal stenosis or epidural hematoma.   Signed by: Colton Orozco 9/9/2024 7:32 PM Dictation workstation:   [id number]    Scheduled medications:  enoxaparin, 40 mg, subcutaneous, q24h  [START ON 9/10/2024] pantoprazole, 40 mg, oral, Daily before breakfast   Or  [START ON 9/10/2024] pantoprazole, 40 mg, intravenous, Daily before breakfast  [START ON 9/10/2024] polyethylene glycol, 17 g, oral, Daily      Continuous medications:     PRN medications:  PRN medications: acetaminophen, bisacodyl, bisacodyl, guaiFENesin, HYDROmorphone, melatonin, ondansetron, oxyCODONE, oxyCODONE     Past Medical History  He has no past medical history on file.    Surgical History  He has a past surgical history that includes Other surgical history (03/08/2021); Other surgical history (03/08/2021); and Other surgical history (03/08/2021).     Social History  He reports that he has quit smoking. His smoking use included cigarettes. He started smoking about 14 years ago. He does not have any smokeless tobacco history on file. No history on file for alcohol use and drug use.    Family History  No family history on file.    Allergies  Patient has no known allergies.    Code Status  Full Code     Review of Systems   Constitutional:  Negative for chills, fatigue and fever.   Respiratory:  Negative for cough, chest tightness, shortness of breath and wheezing.    Cardiovascular:  Negative for chest pain, palpitations and leg swelling.   Gastrointestinal:  Negative for abdominal pain, constipation, diarrhea, nausea and vomiting.   Genitourinary:  Negative for flank pain and hematuria.   Musculoskeletal:  Positive  for back pain. Negative for joint swelling.   Skin:  Negative for rash and wound.   Neurological:  Positive for numbness. Negative for tremors, syncope, weakness and headaches.        Numbness in right upper leg       Last Recorded Vitals  /80   Pulse 61   Temp 36 °C (96.8 °F)   Resp 15   Wt 109 kg (240 lb)   SpO2 94%      Physical Exam:  Vital signs and nursing notes reviewed.   Constitutional: Pleasant and cooperative. Laying in bed in no acute distress. Conversant.   Skin: Warm and dry; no obvious lesions, rashes, pallor, or jaundice. Good turgor.   Eyes: EOMI. Anicteric sclera.   ENT: Mucous membranes moist; no obvious injury or deformity appreciated.   Head and Neck: Normocephalic, atraumatic. ROM preserved. Trachea midline. No appreciable JVD.   Respiratory: Nonlabored on RA. Lungs clear to auscultation bilaterally without obvious adventitious sounds. Chest rise is equal.  Cardiovascular: RRR. No gross murmur, gallop, or rub. Extremities are warm and well-perfused with good capillary refill (< 3 seconds). No chest wall tenderness.   GI: Abdomen soft, nontender, nondistended. No obvious organomegaly appreciated. Bowel sounds are present and normoactive.  : No CVA tenderness.   MSK: Lower thoracic spine tenderness noted. Limitations to AROM/PROM secondary to pain  Extremities: No cyanosis, edema, or clubbing evident. Neurovascularly intact.   Neuro: A&Ox3. CN 2-12 grossly intact. Able to respond to questions appropriately and clearly. No acute focal neurologic deficits appreciated.  Psych: Appropriate mood and behavior.    Assessment/Plan     39 y.o. male with PMHx s/f PTSD presenting with back pain.     Intractable pain secondary to compression fracture of T12 vertebra  -pain meds PRN  -PT appreciated    PTSD  -Continue Lexapro and Wellbutrin    Diet: Regular  DVT Prophylaxis: Lovenox SQ  Code Status: Full code     John Melton PA-C    Dragon dictation software was used to dictate this note and thus  there may be minor errors in translation/transcription including garbled speech or misspellings. Please contact for clarification if needed.

## 2024-09-10 NOTE — ED NOTES
Pt arrives to ED via ems from work    Code Status:  Full Code    HPI     Chief Complaint   Patient presents with    back pain       BP (!) 158/106 (BP Location: Right arm, Patient Position: Sitting)   Pulse 70   Temp 36.6 °C (97.9 °F) (Temporal)   Resp 16   Wt 109 kg (240 lb)   SpO2 95%     [place] Coma Scale Score: 15      LDA:   Peripheral IV 09/09/24 20 G Left Antecubital (Active)   Placement Date/Time: 09/09/24 1101   Size (Gauge): 20 G  Orientation: Left  Location: Antecubital  Site Prep: Chlorhexidine    Number of days: 0        BACKGROUND  No past medical history on file.  Past Surgical History:   Procedure Laterality Date    OTHER SURGICAL HISTORY  03/08/2021    Cholecystectomy    OTHER SURGICAL HISTORY  03/08/2021    Knee surgery    OTHER SURGICAL HISTORY  03/08/2021    Oral surgery     No current facility-administered medications on file prior to encounter.     Current Outpatient Medications on File Prior to Encounter   Medication Sig Dispense Refill    buPROPion XL (Wellbutrin XL) 300 mg 24 hr tablet Take 1 tablet (300 mg) by mouth once daily in the morning.      escitalopram (Lexapro) 20 mg tablet Take 1 tablet (20 mg) by mouth once daily.      fexofenadine (Allegra) 60 mg tablet Take 1 tablet (60 mg) by mouth 2 times a day for 10 days. 20 tablet 0    oxyCODONE (Roxicodone) 5 mg immediate release tablet Take 1 tablet (5 mg) by mouth every 8 hours if needed for severe pain (7 - 10) for up to 3 days. 9 tablet 0        ASSESSMENT  ED Course as of 09/10/24 1025   Mon Sep 09, 2024   1406 Patient is having severe pain at this time on reevaluation after he was moved from the bed to a recliner.  I did order a dose of Dilaudid at this point.  MRI is still 2 hours out. [RB]   1954 Patient's MRI has resulted which shows acute to early subacute traumatic compression fracture of the superior endplate of T12 with approximately 35% height loss with mild associated prevertebral and paravertebral soft tissue edema.   No significant canal stenosis or epidural hematoma.  I am reaching out to orthospine at this time to discuss patient case to see if they are okay with admission to our facility for pain control [RB]      ED Course User Index  [RB] Mary Alice BURKS Maximino, APRN-CNP         Diagnoses as of 09/10/24 1025   Compression fracture of T12 vertebra, initial encounter (Multi)   Intractable pain       Medications Currently Running:  sodium chloride 0.9%, 125 mL/hr, Last Rate: 125 mL/hr (09/10/24 0847)         Medications Given:  ED Medication Administration from 09/09/2024 0945 to 09/10/2024 1025         Date/Time Order Dose Route Action Action by     09/09/2024 1050 EDT morphine injection 4 mg 4 mg intravenous Given [place], C     09/09/2024 1050 EDT ondansetron (Zofran) injection 4 mg 4 mg intravenous Given [place], C     09/09/2024 1153 EDT morphine injection 4 mg 4 mg intravenous Given [place], C     09/09/2024 1408 EDT HYDROmorphone (Dilaudid) injection 0.5 mg 0.5 mg intravenous Given [place], T     09/09/2024 1619 EDT ketorolac (Toradol) injection 15 mg 15 mg intravenous Given [place], C     09/09/2024 1807 EDT HYDROmorphone (Dilaudid) injection 0.5 mg 0.5 mg intravenous Given [place], C     09/09/2024 1846 EDT gadoterate meglumine (Dotarem) 0.5 mmol/mL contrast injection 20 mL 20 mL intravenous Given [place], A     09/09/2024 2224 EDT enoxaparin (Lovenox) syringe 40 mg 40 mg subcutaneous Not Given Cook, E     09/09/2024 2248 EDT oxyCODONE (Roxicodone) immediate release tablet 5 mg 5 mg oral Given [place], E     09/10/2024 0303 EDT oxyCODONE (Roxicodone) immediate release tablet 10 mg 10 mg oral Given Cook, E     09/10/2024 0304 EDT acetaminophen (Tylenol) tablet 650 mg 650 mg oral Given [place], E     09/10/2024 0720 EDT oxyCODONE (Roxicodone) immediate release tablet 10 mg 10 mg oral Given Cook, E     09/10/2024 0721 EDT pantoprazole (ProtoNix) EC tablet 40 mg 40 mg oral Given Cook, E     09/10/2024 0721 EDT pantoprazole (ProtoNix)  injection 40 mg -- intravenous See Alternative MARYA Horner     09/10/2024 0847 EDT sodium chloride 0.9% infusion 125 mL/hr intravenous New Bag JO Mukherjee     09/10/2024 0850 EDT polyethylene glycol (Glycolax, Miralax) packet 17 g 17 g oral Given JO Mukherjee     09/10/2024 0853 EDT HYDROmorphone (Dilaudid) injection 0.2 mg 0.2 mg intravenous Given JO Mukherjee     09/10/2024 0855 EDT buPROPion XL (Wellbutrin XL) 24 hr tablet 300 mg 300 mg oral Given JO Mukherjee     09/10/2024 0855 EDT escitalopram (Lexapro) tablet 20 mg 20 mg oral Given JO Mukherjee                 RESULTS    Imaging:  MR lumbar spine wo IV contrast   Final Result   Acute to early subacute traumatic compression fracture of the   superior endplate of T12 with aproximally 35% height loss. Mild   associated prevertebral and paravertebral soft tissue edema. No   significant canal stenosis or epidural hematoma.        Signed by: Colton Orozco 9/9/2024 7:32 PM   Dictation workstation:   [id number]         }  Labs ::99  Abnormal Labs Reviewed   COMPREHENSIVE METABOLIC PANEL - Abnormal; Notable for the following components:       Result Value    Anion Gap 8 (*)     All other components within normal limits   URINALYSIS WITH REFLEX MICROSCOPIC - Abnormal; Notable for the following components:    Urobilinogen, Urine 3 (1+) (*)     All other components within normal limits   BASIC METABOLIC PANEL - Abnormal; Notable for the following components:    Anion Gap 8 (*)     Creatinine 1.48 (*)     Calcium 7.9 (*)     All other components within normal limits   CBC WITH AUTO DIFFERENTIAL - Abnormal; Notable for the following components:    Hematocrit 40.6 (*)     All other components within normal limits        Report to 3E pato.         Merry Roberts RN  09/10/24 7131

## 2024-09-10 NOTE — CARE PLAN
Problem: Fall/Injury  Goal: Not fall by end of shift  Outcome: Progressing  Goal: Be free from injury by end of the shift  Outcome: Progressing  Goal: Verbalize understanding of personal risk factors for fall in the hospital  Outcome: Progressing  Goal: Verbalize understanding of risk factor reduction measures to prevent injury from fall in the home  Outcome: Progressing  Goal: Use assistive devices by end of the shift  Outcome: Progressing  Goal: Pace activities to prevent fatigue by end of the shift  Outcome: Progressing     Problem: Pain  Goal: Takes deep breaths with improved pain control throughout the shift  Outcome: Progressing  Goal: Turns in bed with improved pain control throughout the shift  Outcome: Progressing  Goal: Walks with improved pain control throughout the shift  Outcome: Progressing  Goal: Performs ADL's with improved pain control throughout shift  Outcome: Progressing  Goal: Participates in PT with improved pain control throughout the shift  Outcome: Progressing  Goal: Free from opioid side effects throughout the shift  Outcome: Progressing  Goal: Free from acute confusion related to pain meds throughout the shift  Outcome: Progressing     Problem: Pain - Adult  Goal: Verbalizes/displays adequate comfort level or baseline comfort level  Outcome: Progressing  Flowsheets (Taken 9/10/2024 1052)  Verbalizes/displays adequate comfort level or baseline comfort level:   Administer analgesics based on type and severity of pain and evaluate response   Consider cultural and social influences on pain and pain management   Assess pain using appropriate pain scale   Implement non-pharmacological measures as appropriate and evaluate response     Problem: Safety - Adult  Goal: Free from fall injury  Outcome: Progressing     Problem: Discharge Planning  Goal: Discharge to home or other facility with appropriate resources  Outcome: Progressing     Problem: Chronic Conditions and Co-morbidities  Goal:  Patient's chronic conditions and co-morbidity symptoms are monitored and maintained or improved  Outcome: Progressing

## 2024-09-10 NOTE — PROGRESS NOTES
Dc Pastor is a 39 y.o. male admitted for Compression fracture of T12 vertebra, initial encounter (Multi). Pharmacy reviewed the patient's [id number] medications and allergies for accuracy.    The list below reflects the PTA list prior to pharmacy medication history. A summary a changes to the PTA medication list has been listed below. Please review each medication in order reconciliation for additional clarification and justification.    [facility]    Medications added:  SIMVASTATIN 10MG 1 at bedtime LAST FILL 3/24  DOXEPIN 25MG 1 at bedtime LF 3/24  PRAZOSIN 5MG 1 at bedtime LF 2/24  Medications modified:    Medications to be removed:  ALLEGRA     Medications of concern:  PT HASN'T HAD HIS MEDS FILLED SINCE 2/24-3/24 90DS      Prior to Admission Medications   Prescriptions Last Dose Informant Patient Reported? Taking?   buPROPion XL (Wellbutrin XL) 300 mg 24 hr tablet   Yes Yes   Sig: Take 1 tablet (300 mg) by mouth once daily in the morning.   escitalopram (Lexapro) 20 mg tablet   Yes Yes   Sig: Take 1 tablet (20 mg) by mouth once daily.   fexofenadine (Allegra) 60 mg tablet   No No   Sig: Take 1 tablet (60 mg) by mouth 2 times a day for 10 days.   oxyCODONE (Roxicodone) 5 mg immediate release tablet   No No   Sig: Take 1 tablet (5 mg) by mouth every 8 hours if needed for severe pain (7 - 10) for up to 3 days.      Facility-Administered Medications: None       Joellen Gaitan

## 2024-09-10 NOTE — PROGRESS NOTES
Dc Pastor is a 39 y.o. male on day 0 of admission presenting with Compression fracture of T12 vertebra, initial encounter (Multi).      Subjective   Dc Pastor is a 39 y.o. male with PMHx s/f PTSD presenting with back pain. He was seen in ED 2 days ago for his back pain after he slipped and fell from the roof (about 12 ft high), landed on his butt and CT showed T12 compression fracture. He was discharged home with oxycodone. He has been taking it regularly but the pain is not improving and now he has some numbness in his right upper leg. He reports the back pain is intermittent, worsens with movements and he couldn't walk/bare weights secondary to pain. Denies f/c/n/v, chest pain, sob, abd pain, saddle paresthesias, urinary retention, urine or bowel incontinence, leg swelling.       ED Course (Summary):   Vitals on presentation: 96.8F, 82 bpm, 16 RR, 141/92, 98% on RA  Labs: CMP unremarkable  CBC unremarkable  UA negative  Imaging: MR lumbar spine-Acute to early subacute traumatic compression fracture of the superior endplate of T12 with aproximally 35% height loss. Mild associated prevertebral and paravertebral soft tissue edema. No significant canal stenosis or epidural hematoma.  Interventions: Dilaudid 0.5 mg x 2, Toradol 15 mg, morphine 4 mg x 2, Zofran 4 mg, admission for further management    9/10: Pain is better controlled with current medication regimen, no bowel or bladder involvement, no lower extremity weakness          Objective     Last Recorded Vitals  /74 (BP Location: Left arm, Patient Position: Lying)   Pulse 77   Temp 37.4 °C (99.4 °F) (Temporal)   Resp 16   Wt 109 kg (240 lb)   SpO2 93%   Intake/Output last 3 Shifts:    Intake/Output Summary (Last 24 hours) at 9/10/2024 1421  Last data filed at 9/10/2024 1234  Gross per 24 hour   Intake 0 ml   Output 100 ml   Net -100 ml       Admission Weight  Weight: 109 kg (240 lb) (09/09/24 0953)    Daily Weight  09/10/24 : 109 kg  (240 lb)    Image Results  MR lumbar spine w and wo IV contrast  Addendum: Interpreted By:  Colton Orozco,  and Radiology Admin    ADDENDUM:   This radiology study is being re-signed due to an administrative   error but not reinterpreted. The new signature merely reflects the   date upon which the administrative error was corrected for placement   in patient chart. The actual interpretation took place in a timely   fashion, consistent with  policy, by the physician referenced as   interpreting the study.        STUDY:  MR LUMBAR SPINE W AND WO IV CONTRAST        Signed by: Colton Orozco 9/10/2024 1:51 PM        -------- ORIGINAL REPORT --------   Dictation workstation:   [id number]  Narrative: Interpreted By:  Colton Orozco,   STUDY:  MR LUMBAR SPINE WO IV CONTRAST;  9/9/2024 7:09 pm      INDICATION:  Signs/Symptoms:back pain.      COMPARISON:  CT thoracic and lumbar spine dated 09/07/2024.      ACCESSION NUMBER(S):  [id number]      ORDERING CLINICIAN:  SAM GANDARA      TECHNIQUE:  Sagittal T2, T1, and STIR and axial T1, T2 sequences of the lumbar  spine. Fat-suppressed axial and sagittal postcontrast images were  obtained after administration of 20 mL Dotarem intravenous contrast.      FINDINGS:      Lumbar spine:  Normal lumbar lordosis. There is a traumatic fracture of the superior  endplate of T12 vertebral body with aproximally 35% height loss.  There is minimal retropulsion of the superior endplate of T12. There  is associated bone marrow edema and enhancement as well as mild  prevertebral and paravertebral edema and enhancement consistent with  acute to early subacute fracture.      Disc space heights are maintained.      The conus medullaris terminates at L1 and is normal in appearance. No  epidural hematoma or abnormal leptomeningeal enhancement.          T12-L1: No significant disc bulge or herniation. No central canal or  foraminal stenosis. L1-S1: No significant disc bulge or  herniation.  No central canal or foraminal stenosis.          Other: There is unchanged 3.4 cm left lower pole bilobed renal cyst.      Impression: Acute to early subacute traumatic compression fracture of the  superior endplate of T12 with aproximally 35% height loss. Mild  associated prevertebral and paravertebral soft tissue edema. No  significant canal stenosis or epidural hematoma.      Signed by: Colton Orozco 9/9/2024 7:32 PM  Dictation workstation:   [id number]      Physical Exam  Patient is awake and orient, not in apparent distress  Eyes: PERRLA, no conjunctival congestion  Chest: Bilateral Air entry, no crackles or wheezing  Heart: s1S2 regular, no murmur  Abdomen: Soft, non tender, BS present  Ext:    Relevant Results               Assessment/Plan      1.  Intractable back pain secondary to compression fracture of T12  MRI shows no cord compression or nerve compression  Will continue on adequate pain management as well as PT OT  Discharge plan in a.m. if patient able to tolerate pain with oral medication        2.  PTSD  Continue home medication  Stable    3.  BIPIN  Will start on NS at 125 mL/h  Monitor renal function               Tawnya Carrera MD

## 2024-09-10 NOTE — PROGRESS NOTES
Physical Therapy                 Therapy Communication Note    Patient Name: Dc Pastor  MRN: [id number]  Today's Date: 9/10/2024     Discipline: Physical Therapy    Missed Visit Reason: Missed Visit Reason: Patient refused (Patient reports having severe HA, declines activity right now. States he was up to chair earlier, still with moderate back pain. Will reattempt tomorrow.)    Missed Time: Attempt    Comment:

## 2024-09-11 ENCOUNTER — TELEPHONE (OUTPATIENT)
Dept: TRANSPLANT | Facility: HOSPITAL | Age: 39
End: 2024-09-11

## 2024-09-11 ENCOUNTER — PHARMACY VISIT (OUTPATIENT)
Dept: PHARMACY | Facility: CLINIC | Age: 39
End: 2024-09-11
Payer: COMMERCIAL

## 2024-09-11 VITALS
WEIGHT: 240 LBS | HEIGHT: 70 IN | DIASTOLIC BLOOD PRESSURE: 78 MMHG | RESPIRATION RATE: 18 BRPM | TEMPERATURE: 98.5 F | OXYGEN SATURATION: 95 % | BODY MASS INDEX: 34.36 KG/M2 | SYSTOLIC BLOOD PRESSURE: 115 MMHG | HEART RATE: 68 BPM

## 2024-09-11 LAB
ANION GAP SERPL CALC-SCNC: 8 MMOL/L (ref 10–20)
BUN SERPL-MCNC: 11 MG/DL (ref 6–23)
CALCIUM SERPL-MCNC: 8.2 MG/DL (ref 8.6–10.3)
CHLORIDE SERPL-SCNC: 104 MMOL/L (ref 98–107)
CO2 SERPL-SCNC: 29 MMOL/L (ref 21–32)
CREAT SERPL-MCNC: 1.1 MG/DL (ref 0.5–1.3)
EGFRCR SERPLBLD CKD-EPI 2021: 88 ML/MIN/1.73M*2
ERYTHROCYTE [DISTWIDTH] IN BLOOD BY AUTOMATED COUNT: 12.6 % (ref 11.5–14.5)
GLUCOSE SERPL-MCNC: 90 MG/DL (ref 74–99)
HCT VFR BLD AUTO: 41.3 % (ref 41–52)
HGB BLD-MCNC: 14.3 G/DL (ref 13.5–17.5)
MAGNESIUM SERPL-MCNC: 1.98 MG/DL (ref 1.6–2.4)
MCH RBC QN AUTO: 30.7 PG (ref 26–34)
MCHC RBC AUTO-ENTMCNC: 34.6 G/DL (ref 32–36)
MCV RBC AUTO: 89 FL (ref 80–100)
NRBC BLD-RTO: 0 /100 WBCS (ref 0–0)
PLATELET # BLD AUTO: 192 X10*3/UL (ref 150–450)
POTASSIUM SERPL-SCNC: 4.1 MMOL/L (ref 3.5–5.3)
RBC # BLD AUTO: 4.66 X10*6/UL (ref 4.5–5.9)
SODIUM SERPL-SCNC: 137 MMOL/L (ref 136–145)
WBC # BLD AUTO: 5.9 X10*3/UL (ref 4.4–11.3)

## 2024-09-11 PROCEDURE — 2500000004 HC RX 250 GENERAL PHARMACY W/ HCPCS (ALT 636 FOR OP/ED): Performed by: INTERNAL MEDICINE

## 2024-09-11 PROCEDURE — 36415 COLL VENOUS BLD VENIPUNCTURE: CPT | Performed by: INTERNAL MEDICINE

## 2024-09-11 PROCEDURE — 2500000004 HC RX 250 GENERAL PHARMACY W/ HCPCS (ALT 636 FOR OP/ED)

## 2024-09-11 PROCEDURE — 96361 HYDRATE IV INFUSION ADD-ON: CPT

## 2024-09-11 PROCEDURE — G0378 HOSPITAL OBSERVATION PER HR: HCPCS

## 2024-09-11 PROCEDURE — 99239 HOSP IP/OBS DSCHRG MGMT >30: CPT | Performed by: INTERNAL MEDICINE

## 2024-09-11 PROCEDURE — RXMED WILLOW AMBULATORY MEDICATION CHARGE

## 2024-09-11 PROCEDURE — 83735 ASSAY OF MAGNESIUM: CPT | Performed by: INTERNAL MEDICINE

## 2024-09-11 PROCEDURE — 80048 BASIC METABOLIC PNL TOTAL CA: CPT | Performed by: INTERNAL MEDICINE

## 2024-09-11 PROCEDURE — 85027 COMPLETE CBC AUTOMATED: CPT | Performed by: INTERNAL MEDICINE

## 2024-09-11 PROCEDURE — 97161 PT EVAL LOW COMPLEX 20 MIN: CPT | Mod: GP | Performed by: PHYSICAL THERAPIST

## 2024-09-11 PROCEDURE — 2500000001 HC RX 250 WO HCPCS SELF ADMINISTERED DRUGS (ALT 637 FOR MEDICARE OP)

## 2024-09-11 RX ORDER — DOCUSATE SODIUM 100 MG/1
100 CAPSULE, LIQUID FILLED ORAL 2 TIMES DAILY
Qty: 20 CAPSULE | Refills: 0 | Status: SHIPPED | OUTPATIENT
Start: 2024-09-11 | End: 2024-09-21

## 2024-09-11 RX ORDER — POLYETHYLENE GLYCOL 3350 17 G/17G
17 POWDER, FOR SOLUTION ORAL DAILY PRN
Qty: 510 G | Refills: 0 | Status: SHIPPED | OUTPATIENT
Start: 2024-09-11

## 2024-09-11 RX ORDER — OXYCODONE HYDROCHLORIDE 5 MG/1
10 TABLET ORAL EVERY 4 HOURS PRN
Qty: 50 TABLET | Refills: 0 | Status: SHIPPED | OUTPATIENT
Start: 2024-09-11 | End: 2024-09-18

## 2024-09-11 RX ADMIN — POLYETHYLENE GLYCOL 3350 17 G: 17 POWDER, FOR SOLUTION ORAL at 08:17

## 2024-09-11 RX ADMIN — BISACODYL 10 MG: 5 TABLET, COATED ORAL at 08:17

## 2024-09-11 RX ADMIN — SODIUM CHLORIDE 125 ML/HR: 9 INJECTION, SOLUTION INTRAVENOUS at 04:48

## 2024-09-11 RX ADMIN — PANTOPRAZOLE SODIUM 40 MG: 40 TABLET, DELAYED RELEASE ORAL at 06:43

## 2024-09-11 RX ADMIN — OXYCODONE HYDROCHLORIDE 10 MG: 10 TABLET ORAL at 06:44

## 2024-09-11 RX ADMIN — OXYCODONE HYDROCHLORIDE 10 MG: 10 TABLET ORAL at 02:52

## 2024-09-11 ASSESSMENT — COGNITIVE AND FUNCTIONAL STATUS - GENERAL
CLIMB 3 TO 5 STEPS WITH RAILING: A LITTLE
MOVING FROM LYING ON BACK TO SITTING ON SIDE OF FLAT BED WITH BEDRAILS: A LITTLE
DAILY ACTIVITIY SCORE: 22
MOBILITY SCORE: 17
CLIMB 3 TO 5 STEPS WITH RAILING: A LOT
WALKING IN HOSPITAL ROOM: A LITTLE
MOVING TO AND FROM BED TO CHAIR: A LITTLE
DRESSING REGULAR LOWER BODY CLOTHING: A LITTLE
TOILETING: A LITTLE
TURNING FROM BACK TO SIDE WHILE IN FLAT BAD: A LITTLE
STANDING UP FROM CHAIR USING ARMS: A LITTLE
WALKING IN HOSPITAL ROOM: A LITTLE
MOBILITY SCORE: 22

## 2024-09-11 ASSESSMENT — PAIN DESCRIPTION - LOCATION
LOCATION: BACK
LOCATION: BACK

## 2024-09-11 ASSESSMENT — PAIN - FUNCTIONAL ASSESSMENT
PAIN_FUNCTIONAL_ASSESSMENT: 0-10
PAIN_FUNCTIONAL_ASSESSMENT: 0-10

## 2024-09-11 ASSESSMENT — PAIN SCALES - GENERAL
PAINLEVEL_OUTOF10: 7
PAINLEVEL_OUTOF10: 7
PAINLEVEL_OUTOF10: 0 - NO PAIN

## 2024-09-11 ASSESSMENT — PAIN DESCRIPTION - ORIENTATION
ORIENTATION: MID
ORIENTATION: MID

## 2024-09-11 NOTE — DISCHARGE SUMMARY
Discharge Diagnosis  Compression fracture of T12 vertebra, initial encounter (Multi)  BIPIN      This discharge took greater than 35 minutes.    Test Results Pending At Discharge  Pending Labs       No current pending labs.            Hospital Course  Patient presented to ER with severe back pain that happened after he fell off the roof .  Imaging shows compression fraction of T12 with no neurovascular compromise.  Patient was started on adequate pain management as well as PT/OT was involved.  Patient feeling better with pain medication and is already going home.  Patient also had BIPIN which improved with IV fluid.  Patient will be discharged home on oral Percocet.  Patient was referred to spine surgery as an outpatient.    Pertinent Physical Exam At Time of Discharge  Physical Exam  Patient is awake and orient, not in apparent distress  Eyes: PERRLA, no conjunctival congestion  Chest: Bilateral Air entry, no crackles or wheezing  Heart: s1S2 regular, no murmur  Abdomen: Soft, non tender, BS present  Ext:    Home Medications     Medication List      START taking these medications     ClearLax 17 gram/dose powder; Generic drug: polyethylene glycol;   Dissolve 17 grams in liquid as directed and take by mouth once daily as   needed (constipation).   docusate sodium 100 mg capsule; Commonly known as: Colace; Take 1   capsule (100 mg) by mouth 2 times a day for 10 days.     CHANGE how you take these medications     oxyCODONE 5 mg immediate release tablet; Commonly known as: Roxicodone;   Take 2 tablets (10 mg) by mouth every 4 hours if needed for moderate pain   (4 - 6) or severe pain (7 - 10) for up to 7 days.; What changed: how much   to take, when to take this, reasons to take this     CONTINUE taking these medications     buPROPion  mg 24 hr tablet; Commonly known as: Wellbutrin XL   doxepin 25 mg capsule; Commonly known as: SINEquan   escitalopram 20 mg tablet; Commonly known as: Lexapro   fexofenadine 60 mg  tablet; Commonly known as: Allegra; Take 1 tablet (60   mg) by mouth 2 times a day for 10 days.   prazosin 5 mg capsule; Commonly known as: Minipress   simvastatin 10 mg tablet; Commonly known as: Zocor       Outpatient Follow-Up  No follow-ups on file.     Tawnya Carrera MD  9/11/2024  11:38 AM

## 2024-09-11 NOTE — NURSING NOTE
Pt being discharged, IV removed, Meds to beds delivered, Walker given to pt by therapy, script w PT. Educated pt and spouse on medication and follow up appointments. No further needs at this time.

## 2024-09-11 NOTE — PROGRESS NOTES
Social work consult placed for positive medical risk screen. SW reviewed pt's chart and communicated with [facility]. No SW needs foreseen at this time. SW signing off; available upon request.    MEREDITH Conti, LSW ([id number])   Care Transitions

## 2024-09-11 NOTE — PROGRESS NOTES
09/11/24 1036   Discharge Planning   Living Arrangements Spouse/significant other;Children   Support Systems Spouse/significant other   Assistance Needed none   Type of Residence Private residence   Home or Post Acute Services None   Expected Discharge Disposition Home   Does the patient need discharge transport arranged? No     Discharge planning assessment completed with patient and spouse at bedside. Patient is from home, normally independent with mobility. He fell off of the roof while making repairs and sustained a T12 fracture. Patient was seen by PT this morning and was cleared for discharge home. PT issued patient a walker for home. [facility]. He is here OBS status and has an active discharge order this morning. Patient is aware to [facility] if he feels he needs PT as an outpatient. Otherwise, patient and wife feel patient will be fine to go home.

## 2024-09-11 NOTE — CARE PLAN
The patient's goals for the shift include  discharge by end of day.    Problem: Fall/Injury  Goal: Not fall by end of shift  Outcome: Progressing  Goal: Be free from injury by end of the shift  Outcome: Progressing  Goal: Verbalize understanding of personal risk factors for fall in the hospital  Outcome: Progressing  Goal: Verbalize understanding of risk factor reduction measures to prevent injury from fall in the home  Outcome: Progressing  Goal: Use assistive devices by end of the shift  Outcome: Progressing  Goal: Pace activities to prevent fatigue by end of the shift  Outcome: Progressing     Problem: Pain  Goal: Takes deep breaths with improved pain control throughout the shift  Outcome: Progressing  Goal: Turns in bed with improved pain control throughout the shift  Outcome: Progressing  Goal: Walks with improved pain control throughout the shift  Outcome: Progressing  Goal: Performs ADL's with improved pain control throughout shift  Outcome: Progressing  Goal: Participates in PT with improved pain control throughout the shift  Outcome: Progressing  Goal: Free from opioid side effects throughout the shift  Outcome: Progressing  Goal: Free from acute confusion related to pain meds throughout the shift  Outcome: Progressing     Problem: Pain - Adult  Goal: Verbalizes/displays adequate comfort level or baseline comfort level  Outcome: Progressing  Flowsheets (Taken 9/11/2024 0927)  Verbalizes/displays adequate comfort level or baseline comfort level:   Assess pain using appropriate pain scale   Administer analgesics based on type and severity of pain and evaluate response   Implement non-pharmacological measures as appropriate and evaluate response   Consider cultural and social influences on pain and pain management     Problem: Safety - Adult  Goal: Free from fall injury  Outcome: Progressing     Problem: Discharge Planning  Goal: Discharge to home or other facility with appropriate resources  Outcome:  Progressing     Problem: Chronic Conditions and Co-morbidities  Goal: Patient's chronic conditions and co-morbidity symptoms are monitored and maintained or improved  Outcome: Progressing

## 2024-09-11 NOTE — PROGRESS NOTES
Physical Therapy    Physical Therapy Evaluation    Patient Name: Dc Pastor  MRN: [id number]  Today's Date: 9/11/2024   Time Calculation  Start Time: 0855  Stop Time: 0912  Time Calculation (min): 17 min  3309/3309-A    Assessment/Plan   PT Assessment  PT Assessment Results: Decreased endurance, Decreased mobility, Pain  Rehab Prognosis: Good  Barriers to Discharge: none  Evaluation/Treatment Tolerance: Patient tolerated treatment well (despite pain)  Medical Staff Made Aware: Yes  End of Session Communication: Bedside nurse, Care Coordinator  Assessment Comment: Patient requires only CGA x1 for mobility. Anticipate good improvement over course of admit.  End of Session Patient Position: Bed, 3 rail up, Alarm on  IP OR SWING BED PT PLAN  Inpatient or Swing Bed: Inpatient  PT Plan  Treatment/Interventions: Bed mobility, Transfer training, Gait training, Stair training, Balance training, Endurance training, Therapeutic activity  PT Plan: Ongoing PT  PT Frequency: 4 times per week  PT Discharge Recommendations: No PT needed after discharge (follow-up out patient once cleared by MD if needed)  Equipment Recommended upon Discharge: Wheeled walker (Issued to patient at end of treatment)  PT - OK to Discharge: Yes (when medically cleared)    General Visit Information:  General  Reason for Referral: Dx: intractible back pain due to recent T12 compression fx, R LE numbness  Referred By: Geronimo  Past Medical History Relevant to Rehab: PTSD, tobacco use, recent fall with T12 compression fx  Prior to Session Communication: Bedside nurse  Patient Position Received: Bed, 3 rail up, Alarm on  General Comment: Seen in room 3309, wife present, IV    Home Living:  Home Living  Type of Home:  (Lives with wife in multi-level home, amb without AD, independent with ADLs, works)    Prior Level of Function:       Precautions:  Precautions  Precautions Comment: falls, spinal precautions    Vital Signs:     Objective     Pain:  Pain  Assessment  0-10 (Numeric) Pain Score:  (mild pain, reported improvement compared to admit)    Cognition:  Cognition  Overall Cognitive Status: Within Functional Limits    General Assessments:  General Observation  General Observation: Educated patient and wife on spinal precautions and log roll technique during and after treatment   Activity Tolerance  Endurance: Tolerates 10 - 20 min exercise with multiple rests     Strength  Strength Comments: LE's adequate for mobility, no buckling of knees, no further reports of numbness        Postural Control  Postural Control:  (sitting balance fair/fair+; standing balance fair with AD)          Functional Assessments:     Bed Mobility  Bed Mobility:  (Supine to/from sit via L sidelying with CGA assist x 1; cues for sequencing, safety, spinal precautions/log roll technique)  Transfers  Transfer:  (Sit to stand with CGA x1 and FWW; cues for AD use, balance, posture, wt shifting)  Ambulation/Gait Training  Ambulation/Gait Training Performed:  (Amb 65 feet with FWW and CGA/SBA x1; cues for pacing, safety, AD use, posture)          Extremity/Trunk Assessments:                Outcome Measures:     [facility] Basic Mobility  Turning from your back to your side while in a flat bed without using bedrails: A little  Moving from lying on your back to sitting on the side of a flat bed without using bedrails: A little  Moving to and from bed to chair (including a wheelchair): A little  Standing up from a chair using your arms (e.g. wheelchair or bedside chair): A little  To walk in hospital room: A little  Climbing 3-5 steps with railing: A lot  Basic Mobility - Total Score: 17                                                             Goals:  Encounter Problems       Encounter Problems (Active)       PT Problem       transfers       Start:  09/11/24    Expected End:  09/25/24       Patient will perform all transfers with SBA x1; Maintaining spinal precautions            gait       Start:   09/11/24    Expected End:  09/25/24       Patient will amb 100+ feet with SBA x1          stairs       Start:  09/11/24    Expected End:  09/25/24       Patient will ascend/descend 3+ stairs with rail/device prn and SBA/CGA x1             Pain - Adult            Education Documentation  Precautions, taught by Sophia Browne, PT at 9/11/2024  9:26 AM.  Learner: Significant Other, Patient  Readiness: Acceptance  Method: Explanation  Response: Verbalizes Understanding    Body Mechanics, taught by Sophia Browne, PT at 9/11/2024  9:26 AM.  Learner: Significant Other, Patient  Readiness: Acceptance  Method: Explanation  Response: Verbalizes Understanding    Mobility Training, taught by Sophia Browne, PT at 9/11/2024  9:26 AM.  Learner: Significant Other, Patient  Readiness: Acceptance  Method: Explanation  Response: Verbalizes Understanding    Education Comments  No comments found.

## 2024-11-13 ENCOUNTER — CLINICAL SUPPORT (OUTPATIENT)
Dept: TRANSPLANT | Facility: HOSPITAL | Age: 39
End: 2024-11-13
Payer: SUBSIDIZED

## 2024-11-13 ENCOUNTER — HOSPITAL ENCOUNTER (OUTPATIENT)
Dept: CARDIOLOGY | Facility: CLINIC | Age: 39
Discharge: HOME | End: 2024-11-13
Payer: SUBSIDIZED

## 2024-11-13 DIAGNOSIS — Z00.5 TRANSPLANT DONOR EVALUATION: Primary | ICD-10-CM

## 2024-11-13 DIAGNOSIS — Z52.4 DONOR OF KIDNEY FOR TRANSPLANT: ICD-10-CM

## 2024-11-13 PROCEDURE — 93786 AMBL BP MNTR W/SW REC ONLY: CPT

## 2024-11-13 PROCEDURE — 90791 PSYCH DIAGNOSTIC EVALUATION: CPT | Performed by: PSYCHOLOGIST

## 2024-11-13 NOTE — PROGRESS NOTES
BEHAVIORAL HEALTH: PSYCHOLOGICAL ASSESSMENT FOR LIVING DONATION    Patient's Name: Dc Pastor  :  1985  MRN:  [id number]    Diagnosis: transplant donor evaluation  Intended recipient: sister of a  friend (served in [employer] together)    Date of Service: 2024  Start Time: 11:00 am   End Time: 12:00 pm  Location: [facility]plant [place], [facility]     Presenting information: Mr. Little is being seen in the transplant clinic for this psychological evaluation as one part of a comprehensive assessment for living donation. Of note, he was diagnoed with anxiety and PTSD in  after serving in the [employer]. He continues to participate in psychiatric and counseling services. On 2024 he was admitted to a local hospital after a fall that caused at T12 decompression fracture also with BIPIN. He was seen alone today and appeared well. 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 He wore a back brace after the fall and indicated today that a neurosurgeon is still debating whether he needs a surgery to stabilize the vertebra. He notices a pinching feeling sometimes when he lifts something but indicated he has not been in a lot of pain.     Of the recipient, he explained that she is the sister of someone he served with in [employer]. The now- soldier actually knew Mr. Little's wife (also an [employer] ) better than he knew him. Mr. Little knows the mother of the soldier and recipient but does not really know the recipient.     MEDICAL HISTORY  Prior surgeries: knee reconstruction, gallbladder    Diet/exercise: very busy at campground, during winter he swims; normal diet    Attendance at Appointments:   [facility]   -Mental health: yes    -PT/Rehabilitation: yes-in the past    Use of NSAIDS: does not use    SUBSTANCE USE  Tobacco: none    Alcohol:  a couple of times a month    Illicit Substance Use:  "none    Substance Disorder Treatment: none    PSYCHOLOGICAL HISTORY  Current mood \"generally good\"    Previously Diagnosed Psychological Disorders:   Mood Disorders:  -PTSD: He was in a combat-heavy area (where [place] and 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 He admitted he had a hard time when he first got back, but with a lot of therapy and some medical intervention, the majority of his symptoms have subsided. What remains is some anxiety, a few flashbacks.  Developmental/Behavioral Disorders: none  Cognitive Disorders:   -Prior head injury: He was not diagnosed with TBI, has had some hits to the head over time.  Personality Disorders: none  Psychotic Disorders: none    Treatment for Psychological Disorders:  Outpatient counseling:   -number of treatment episodes: did counseling for several months  -last counseling effort (location, timeframe): [facility]  Pharmacological management:   -previous psychotropic medications used: Ambien, Seroquel--he didn't like the effects and gladly came off  -current psychotropic medications: escitalopram  -[facility], [facility] six months  Inpatient treatment: none    Suicidal/Homicidal Tendencies: denied    Coping Strategies: meditation/mindfulness    Coping with surgery/recovery: He enjoys reading, 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     Suicide Risk Assessment:  Risk factors: prior history of trauma  Protective factors: marriage/partnership, children, therapeutic relationship, good social support, expressed desire to live    Mental Status Exam:  General Appearance: well groomed, appropriate eye contact  Attitude/Behavior: cooperative  Motor: no psychomotor agitation or retardation, no tremor or other abnormal movements  Speech: normal rate, volume, prosody  Gait/Station: [facility]  Mood: euthymic  Affect: euthymic, full-range  Thought Process: linear, goal " "directed  Thought Associations: no loosening of associations  Thought Content: normal  Perception: no perceptual abnormalities noted  Sensorium: alert and oriented to person, place, time and situation  Insight: intact  Judgment: intact  Cognition: cognitively intact to conversational testing with respect to attention, orientation, fund of knowledge, recent and remote memory, and language    SOCIAL HISTORY  Siblings: one sister, two half-sisters and several step-siblings  Parents: mother is living; father  11 years ago of ALS  Relationship Status:  in   Children: 13 yo twins  Education: college degree   service: He was in the [employer] for 8 years and did serve in [place].  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  Employment Status: FT  Current living situation: wife, 2 kids    DONOR-SPECIFIC ISSUES  -Advised supervisors of potential absence from work? N/A--He is self-employed.  -Type of leave available: none   -Financial backups in place in the event of loss of income: The surgery needs to be at the right time of year to avoid loss of income. He will still be able to work on things with the travel agency during his recovery.    -Degree of support from family, friends, trusted others: His wife has been \"fantastic\" and his mother is worried. She told him she wished he would not do this (out of concern for his welfare) but she will support him.  -Personal motivation for attempting donation: He reported a \"multitude\" of reasons why he feels compelled to donate, including, \"I don't see any reason not to\" and\"I don't need two of them.\" He added that he imagines that his friend would try to donate to his sister if he were still here.  -Other types of charitable giving: registered as an organ donor, does a lot of lore work    -On a scale from 0 (no desire to donate) to 10 (absolute desire to donate), rate yourself regarding your readiness and willingness to donate: 9  -How " "would you emotionally react to learning that the kidney you donated did not help the recipient: \"That's not the point of it.\"  -What regrets do you anticipate having after donation? He admitted he has considered what might happen if one of his daughters ever needed a kidney. He knows they have a low likelihood of this happening but it would trouble him not be able to help.  -How do you expect your relationship with the recipient to change following donation? Not really expecting anything, but \"it would be cool\" if they got to know each other.  -Do you have concerns about the recipient's ability to care for the transplanted kidney? No, \"it's not mine\" after giving it away.    Primay caregiver: wife      IMPRESSIONS  Overall health and commitment to aftercare: Mr. Pastor is already an active person who is focused on maintaining his health. He has a good understanding of donation expectations for obtaining routine health care, temporary restrictions following surgery, and long-term goals for staying active and consuming a healthy diet. His recent injury has not deterred him from pursuing living donation and should not impede him moving forward.    Substance use issues: no concerns    Psychological issues: Mr. Pastor was diagnosed with PTSD after serving in a combat-heavy part of [facility] but he did receive intensive psychiatric and psychological treatment after returning home. Anxiety symptoms are minimal. [facility] and has developed effective mindfulness coping strategies.     Social support:  appropriate    Coercion: I detected no evidence of overt coercion in the decision to come forward as a living donor. Further, this potential donor did confirm understanding of the ability to end this evaluation and/or stop the process at any point leading up to the actual surgery for any reason.    Informed consent: This potential donor expressed a reasonable understanding of risks of the " surgery and a reasonable understanding of the postoperative course and long-term expectations for self-care.    RECOMMENDATIONS  I identified no psychological contraindications for living donation. Mr. Pastor is capable of providing informed consent.    Vika Herrera, PhD  Clinical Psychologist, [facility] [place]  Clinical Professor, Department of Psychiatry  [facility]      Note to patient: The 21st Century Cares Act makes medical notes like these available to patients in the interest of transparency; however, be advised this is a medical document. It is intended as communication with other medical professionals, not as a form of communication from the practitioner to the patient. It is written in medical language and may contain abbreviations or verbiage that are unfamiliar. It may appear blunt or direct. Medical documents are intended to carry relevant information, facts as evident, and the clinical opinion of the practitioner.

## 2024-11-14 NOTE — PROCEDURES
24-hour Ambulatory Blood Pressure Monitor Report  [facility] [place]    Device: [employer] Kirti ABPM 7100    Period of Monitoring: From 11/13/2024, 1:10 PM to 11/14/2024, 1:20 PM.     Successful Readings: 49 (77 %)     Indication:   Suspected white coat hypertension    Current Medications:  Current Outpatient Medications on File Prior to Encounter   Medication Sig Dispense Refill    buPROPion XL (Wellbutrin XL) 300 mg 24 hr tablet Take 1 tablet (300 mg) by mouth once daily in the morning.      doxepin (SINEquan) 25 mg capsule Take 1 capsule (25 mg) by mouth once daily at bedtime.      escitalopram (Lexapro) 20 mg tablet Take 1 tablet (20 mg) by mouth once daily.      fexofenadine (Allegra) 60 mg tablet Take 1 tablet (60 mg) by mouth 2 times a day for 10 days. 20 tablet 0    polyethylene glycol (Glycolax, Miralax) 17 gram/dose powder Dissolve 17 grams in liquid as directed and take by mouth once daily as needed (constipation). 510 g 0    prazosin (Minipress) 5 mg capsule Take 1 capsule (5 mg) by mouth once daily at bedtime.      simvastatin (Zocor) 10 mg tablet Take 1 tablet (10 mg) by mouth once daily at bedtime.       No current facility-administered medications on file prior to encounter.          Findings (Please see attached report):   Average ambulatory blood pressure was 135/82 mmHg with a heart rate of 73 beats per minute.     The highest SBP and DBP was 205 at 8:20 and 124 at 13:14.     The lowest SBP and DBP was 91 at 4:00 and 53 at 23:00.     From 8 a.m. to 11 p.m., average blood pressure was 140/85 mmHg with average heart rate of 74 beats per minute.     From 11 p.m. to 8 a.m., average blood pressure was 112/68 mmHg with a heart rate of 66 beats per minute.     Patient went to bed at unknown and woke up at unknown.    Patient reported symptoms: None     Impression: Average 24-hour ambulatory blood pressure of 135/82 mmHg indicates stage 1 hypertension. Patient has  normal nocturnal BP dipping. White coat effect is seen at study start.     Recommendation: Management per referring clinician.       Carlos Ortez MD, JILL, [facility]  Director,  Center for Cardiovascular Prevention  [place] [facility]       Recommended standards for normal ambulatory blood pressure values which are proposed to be equivalent to clinic BP of <130/80 mmHg include: daytime BP <130/80 mg Hg, nighttime BP <110/65 mm Hg, and 24 hour BP <125/75 mm Hg.   (Lizz PK, et al.2017 High Blood Pressure Clinical Practice Guideline, Hypertension. 2017).     The clinical criteria for white coat hypertension are defined as:   Office blood pressure =130/80 mm Hg but <160/100 mm Hg after three month trial of lifestyle modification and suspected white coat hypertension with daytime ABPM or HBPM blood pressure <130/80 mm Hg.     The clinical criteria for masked hypertension are defined as:  Office blood pressure of 120 - 129/<80 mm Hg after three month trial of lifestyle modification and suspected masked hypertension with daytime ABPM or HBPM blood pressure =130/80 mm Hg.   (Measurement of Blood Pressure in Humans, A Scientific Statement from the American Heart Association, May 2019).

## 2024-11-19 ENCOUNTER — TELEPHONE (OUTPATIENT)
Dept: TRANSPLANT | Facility: HOSPITAL | Age: 39
End: 2024-11-19
Payer: SUBSIDIZED

## 2024-11-19 NOTE — TELEPHONE ENCOUNTER
Spoke with Tim to see how he has been doing since the fall. He said that his back is healing well and there is no need for surgery at this time. However, he did have jaw surgery yesterday due to an infection. 24h ABPM completed and results sent to Dr. Barrios for review. Told Tim that it is indicating stage 1 hypertension. I will let him know if Dr. Barrios believes he needs to be treated. Tim said that he has the results and [facility].

## 2024-11-26 ENCOUNTER — DOCUMENTATION (OUTPATIENT)
Dept: TRANSPLANT | Facility: HOSPITAL | Age: 39
End: 2024-11-26
Payer: SUBSIDIZED

## 2024-11-26 NOTE — PROGRESS NOTES
Inquired at patient review meeting of Tim's injury and when it would be safe for him to have surgery. Per Dr. Wheatley, should wait at least 6 mos before donation due to OR table positioning.

## 2024-12-05 ENCOUNTER — TELEPHONE (OUTPATIENT)
Dept: TRANSPLANT | Facility: HOSPITAL | Age: 39
End: 2024-12-05
Payer: SUBSIDIZED

## 2024-12-05 NOTE — TELEPHONE ENCOUNTER
Spoke with Tim and let him know that our Nephrologist reviewed his 24h ABPM results. She feels that his BP is not under good control on his current dose of Prozosin and he should see his PCP. Tim said that he really hasn't followed up his doctor and plans to do so. He will let me know what his doctor says. We will then recheck his BP monitor again in 3 mos. Tim was also notified that we would need to wait 6 mos from the time of his back fracture to go to surgery due to positioning on the OR table. He expressed understanding. He relayed that his recipient is at [facility]. Will reach out to that center to check on status of the recipient.

## 2025-02-18 ENCOUNTER — DOCUMENTATION (OUTPATIENT)
Facility: HOSPITAL | Age: 40
End: 2025-02-18
Payer: OTHER GOVERNMENT

## 2025-02-18 NOTE — PROGRESS NOTES
Saw PCP at [facility] for hypertension. Started Amlodipine 2.5mg on 2/4/2025. Will need to repeat a 24h ABPM once stable on medication. [facility]

## 2025-05-20 ENCOUNTER — DOCUMENTATION (OUTPATIENT)
Facility: HOSPITAL | Age: 40
End: 2025-05-20
Payer: OTHER GOVERNMENT

## 2025-05-20 ENCOUNTER — TELEPHONE (OUTPATIENT)
Facility: HOSPITAL | Age: 40
End: 2025-05-20
Payer: OTHER GOVERNMENT

## 2025-05-20 DIAGNOSIS — Z52.4 DONOR OF KIDNEY FOR TRANSPLANT: Primary | ICD-10-CM

## 2025-05-20 NOTE — TELEPHONE ENCOUNTER
Spoke with Tim today for an update. He said that he has been on Lisinopril 5mg since at least February. We will go ahead an order the repeat 24h ABPM. Tim relayed that he is scheduled to have an achilles tendon repair in July. Told him that I will review this with the team to see which surgery is better to have done first. Will also contact his recipient's center for an update. He said that he believes the recipient was recently listed for transplant.

## 2025-05-20 NOTE — PROGRESS NOTES
Update from Tim discussed in our patient review meeting today. Achilles tendon repair can be a 6-12 mo recovery. If waiting to donate until after this surgery, he would need to wait at least 3 mos, can no longer be on crutches and receive clearance from ortho. This was discussed with Tim, who expressed understanding. He let me know that he is leaving for [place] in a week and is hoping to know a plan before he leaves. We will keep him posted with what we find out.

## 2025-05-22 ENCOUNTER — HOSPITAL ENCOUNTER (OUTPATIENT)
Dept: CARDIOLOGY | Facility: CLINIC | Age: 40
Discharge: HOME | End: 2025-05-22
Payer: OTHER GOVERNMENT

## 2025-05-22 DIAGNOSIS — Z52.4 DONOR OF KIDNEY FOR TRANSPLANT: ICD-10-CM

## 2025-05-22 PROCEDURE — 93786 AMBL BP MNTR W/SW REC ONLY: CPT

## 2025-05-23 NOTE — PROCEDURES
24-hour Ambulatory Blood Pressure Monitor Report  [facility] [place]    Device: [employer] Kirti ABPM 7100    Period of Monitoring: From 5/22/2025, 9:47 AM to 5/23/2025, 10:23 AM.     Successful Readings: 55 (95 %)     Indication:   Suspected white coat hypertension    Current Medications:  Medications Ordered Prior to Encounter[1]       Findings (Please see attached report):   Average ambulatory blood pressure was 129/72 mmHg with a heart rate of 67 beats per minute.     The highest SBP and DBP was 153 at 10:03 and 113 at 14:40.     The lowest SBP and DBP was 96 at 17:23 and 52 at 0:00.     From 8 a.m. to 11 p.m., average blood pressure was 132/73 mmHg with average heart rate of 68 beats per minute.     From 11 p.m. to 8 a.m., average blood pressure was 112/62 mmHg with a heart rate of 58 beats per minute.     Patient went to bed at unknown and woke up at unknown.    Patient reported symptoms: None     Impression: Average 24-hour ambulatory blood pressure of 129/72 mmHg indicates that systolic/diastolic blood pressure is adequately controlled. Patient has normal nocturnal BP dipping. No white coat effect is seen.    Recommendation: Management per referring clinician.       Carlos Ortez MD, FA, [facility]  Director,  Center for Cardiovascular Prevention  [place] [facility]       Recommended standards for normal ambulatory blood pressure values which are proposed to be equivalent to clinic BP of <130/80 mmHg include: daytime BP <130/80 mg Hg, nighttime BP <110/65 mm Hg, and 24 hour BP <125/75 mm Hg.   (Lizz ZAMUDIO, et al.2017 High Blood Pressure Clinical Practice Guideline, Hypertension. 2017).     The clinical criteria for white coat hypertension are defined as:   Office blood pressure =130/80 mm Hg but <160/100 mm Hg after three month trial of lifestyle modification and suspected white coat hypertension with daytime ABPM or HBPM blood  pressure <130/80 mm Hg.     The clinical criteria for masked hypertension are defined as:  Office blood pressure of 120 - 129/<80 mm Hg after three month trial of lifestyle modification and suspected masked hypertension with daytime ABPM or HBPM blood pressure =130/80 mm Hg.   (Measurement of Blood Pressure in Humans, A Scientific Statement from the American Heart Association, May 2019).           [1]   Current Outpatient Medications on File Prior to Encounter   Medication Sig Dispense Refill    buPROPion XL (Wellbutrin XL) 300 mg 24 hr tablet Take 1 tablet (300 mg) by mouth once daily in the morning.      doxepin (SINEquan) 25 mg capsule Take 1 capsule (25 mg) by mouth once daily at bedtime.      escitalopram (Lexapro) 20 mg tablet Take 1 tablet (20 mg) by mouth once daily.      fexofenadine (Allegra) 60 mg tablet Take 1 tablet (60 mg) by mouth 2 times a day for 10 days. 20 tablet 0    polyethylene glycol (Glycolax, Miralax) 17 gram/dose powder Dissolve 17 grams in liquid as directed and take by mouth once daily as needed (constipation). 510 g 0    prazosin (Minipress) 5 mg capsule Take 1 capsule (5 mg) by mouth once daily at bedtime.      simvastatin (Zocor) 10 mg tablet Take 1 tablet (10 mg) by mouth once daily at bedtime.       No current facility-administered medications on file prior to encounter.

## 2025-05-27 ENCOUNTER — DOCUMENTATION (OUTPATIENT)
Facility: HOSPITAL | Age: 40
End: 2025-05-27
Payer: OTHER GOVERNMENT

## 2025-05-27 ENCOUNTER — TELEPHONE (OUTPATIENT)
Facility: HOSPITAL | Age: 40
End: 2025-05-27
Payer: OTHER GOVERNMENT

## 2025-05-27 NOTE — TELEPHONE ENCOUNTER
Updated Tmi that I spoke with the LORIE and confirmed that they did meet on 8/28/2024. The LORIE's note was deleted from the system but he will correct that. Also let him know that his recipient's center ran a virtual crossmatch and he is unable to directly donate. We discussed voucher donation and Tim is willing to do so. Discussed plan to present him at committee on Tuesday. If approved, will get him scheduled for KPD education/consent and start the enrollment process. Tim said that he would like to donate sooner rather than later. He will be on vacation from 6/6 to 6/16.

## 2025-05-27 NOTE — PROGRESS NOTES
"    Kidney Donor Review - Stage 1    Donor   Name: Dc Pastor   MRN: [id number]   Living Donor Nurse Coordinator: Irlanda Ortega      Test Donor Results Acceptable to proceed with donation?    LABS     eGFR calculation eGFR (mL/min/1.73m*2)   Date Value   09/11/2024 88     Acceptable   Urinalysis with Reflex Culture and Microscopic    Color, Urine (no units)   Date Value   09/09/2024 Yellow      Appearance, Urine (no units)   Date Value   09/09/2024 Clear      Specific Gravity, Urine (no units)   Date Value   09/09/2024 1.029      pH, Urine (no units)   Date Value   09/09/2024 7.5      Total Protein, Urine (mg/dL)   Date Value   08/28/2024 6     Protein, Urine (mg/dL)   Date Value   09/09/2024 10 (TRACE)      Glucose, Urine (mg/dL)   Date Value   09/09/2024 Normal      Blood, Urine (no units)   Date Value   09/09/2024 NEGATIVE      Ketones, Urine (mg/dL)   Date Value   09/09/2024 NEGATIVE      Bilirubin, Urine (no units)   Date Value   09/09/2024 NEGATIVE      Urobilinogen, Urine (mg/dL)   Date Value   09/09/2024 3 (1+) (A)      Nitrite, Urine (no units)   Date Value   09/09/2024 NEGATIVE      Leukocyte Esterase, Urine (no units)   Date Value   09/09/2024 NEGATIVE     Acceptable   Drug Screen No results found for: \"AMPHETAMINE\"   No results found for: \"BARBSCRNUR\"   No results found for: \"BENZO\"   No results found for: \"CANNABINOID\"   No results found for: \"COCAI\"   No results found for: \"FENTANYL\"   No results found for: \"OPIATE\"   No results found for: \"OXYCODONE\"   No results found for: \"PCP\"   No results found for: \"METH\"  NEEDED   CrCl Method 24 Hr Urine Acceptable   Creatinine Clearance Creatinine Clearance:   Creatinine Clearance (mL/min)   Date Value   08/28/2024 116    mCrCl RAW - 116.43  CKD-EPI - 75.49  mCrCl/BSA - 86.13  M24h uCr excretion - 2028.75  Expected uCr excretion - 2121.89 Acceptable   cystatin-C eGFR BY Cystatin C (mL/min/BSA)   Date Value   08/28/2024 85    Acceptable        CBC  "   nRBC (/100 WBCs)   Date Value   09/11/2024 0.0      WBC (x10*3/uL)   Date Value   09/11/2024 5.9      Hemoglobin (g/dL)   Date Value   09/11/2024 14.3      Platelets (x10*3/uL)   Date Value   09/11/2024 192    RBC 4.66 Acceptable   PTT aPTT (seconds)   Date Value   08/28/2024 34      Acceptable   INR INR (no units)   Date Value   08/28/2024 1.0    Acceptable}        Donor ABO ABO TYPE (no units)   Date Value   08/28/2024 A       Acceptable   Recipient ABO Need confirmation    Preliminary crossmatch  Virtual xm pending Cross Match:Pending   HLA DNA Type Panel  Acceptable   Direct, KPD, or ABOi TBD NEEDED   SEROLOGIES     Hepatitis B Surface Antibody Hepatitis B Surface AB (mIU/mL)   Date Value   08/28/2024 218.3 (H)    Acceptable   Hepatitis B Surface Antigen Hepatitis B Surface AG (no units)   Date Value   08/28/2024 Nonreactive     Acceptable   Hepatitis B Core Antibody Nonreactive Acceptable   Hepatitis C Antibody Hepatitis C AB (no units)   Date Value   08/28/2024 Nonreactive    Acceptable   HIV AG/AB Qualitative HIV 1/2 Antigen/Antibody Screen with Reflex to Confirmation (no units)   Date Value   08/28/2024 Nonreactive    Acceptable   Syphilis RPR Syphilis Total Ab (no units)   Date Value   08/28/2024 Nonreactive         Acceptable   TB Test T-SPOT. TB Interpretation (no units)   Date Value   08/28/2024 Invalid   Need to repeat NEEDED   ADDITIONAL TESTING ITEMS     24 hour BP monitor 11/13/2024 ABPM #1   Average 24-hour ambulatory blood pressure of 135/82 mmHg indicates stage 1 hypertension. Patient has normal nocturnal BP dipping.   Sent to PCP and placed on Lisinopril February 2025. Repeat testing in 3 mos  UNACCEPTABLE -Repeat in 3 mos (5/2025)   24 hour BP monitor 5/22/2025 ABPM #2  Average 24-hour ambulatory blood pressure of 129/72 mmHg indicates that systolic/diastolic blood pressure is adequately controlled. Patient has normal nocturnal BP dipping.  Acceptable   N/A  N/A   IMAGING RESULTS     CXR No  results found for this or any previous visit.          NEEDED   CT angio abdomen pelvis w and or wo IV IV contrast   === Results for orders placed during the hospital encounter of 08/28/24 ===    CT angio abdomen pelvis w and or wo IV IV contrast [DEN400] 08/28/2024    Status: Normal  1. Typical renal vasculature with widely patent single right and left  renal arteries and veins.  2. The right kidney measures 11.8 cm and the left kidney measures 11  cm in craniocaudal dimensions. Stable appearance of bilobed left  inferior renal cyst.  3. No significant abnormality of the remaining vascular and  nonvascular abdominopelvic structures.      I personally reviewed the images/study and I agree with the findings  as stated by Resident Mark Duncan MD.    MACRO:  None.    Signed by: Alessandro Gonzalez 9/3/2024 5:47 AM  Dictation workstation:   [id number]     Kidneys RIGHT slightly larger.  No stones or masses.  LEFT with inf pole cyst.     LEFT single vessels, no early arterial branching. LEFT is suitable for donation anatomically if patient is o/w medically and psychosocially cleared by the MDR team. (ZAS)  Acceptable     Completed by: Irlanda Ortega   Reviewed with Donor Surgeon:  [facility] [place]  Date: 5/27/2025

## 2025-05-27 NOTE — PROGRESS NOTES
"Outside height/weight documented at [facility] -   Height 71.5\"  Weight 244.6 lbs  BMI 33.6  "

## 2025-05-28 ENCOUNTER — APPOINTMENT (OUTPATIENT)
Dept: LAB | Facility: HOSPITAL | Age: 40
End: 2025-05-28
Payer: OTHER GOVERNMENT

## 2025-05-28 ENCOUNTER — HOSPITAL ENCOUNTER (OUTPATIENT)
Dept: RADIOLOGY | Facility: HOSPITAL | Age: 40
Discharge: HOME | End: 2025-05-28
Payer: OTHER GOVERNMENT

## 2025-05-28 ENCOUNTER — APPOINTMENT (OUTPATIENT)
Facility: HOSPITAL | Age: 40
End: 2025-05-28
Payer: OTHER GOVERNMENT

## 2025-05-28 DIAGNOSIS — Z52.4 KIDNEY DONOR: Primary | ICD-10-CM

## 2025-05-28 DIAGNOSIS — Z52.4 DONOR OF KIDNEY FOR TRANSPLANT: ICD-10-CM

## 2025-05-28 LAB
AMPHETAMINES UR QL SCN: NORMAL
BARBITURATES UR QL SCN: NORMAL
BENZODIAZ UR QL SCN: NORMAL
BZE UR QL SCN: NORMAL
CANNABINOIDS UR QL SCN: NORMAL
FENTANYL+NORFENTANYL UR QL SCN: NORMAL
METHADONE UR QL SCN: NORMAL
OPIATES UR QL SCN: NORMAL
OXYCODONE+OXYMORPHONE UR QL SCN: NORMAL
PCP UR QL SCN: NORMAL

## 2025-05-28 PROCEDURE — 86481 TB AG RESPONSE T-CELL SUSP: CPT | Performed by: HOSPITALIST

## 2025-05-28 PROCEDURE — 36415 COLL VENOUS BLD VENIPUNCTURE: CPT

## 2025-05-28 PROCEDURE — 71046 X-RAY EXAM CHEST 2 VIEWS: CPT

## 2025-05-28 PROCEDURE — 80307 DRUG TEST PRSMV CHEM ANLYZR: CPT | Performed by: HOSPITALIST

## 2025-05-28 NOTE — PROGRESS NOTES
Living donor advocate report:   I spoke with Mr. Pastor this afternoon regarding his decision to donate a kidney. We discussed the following topics:     a) evaluation and informed consent process  b) knowledge of the surgical procedure, major risks and benefits (immediate and long-term; medical and psycho-social)  c) evidence of ability to evaluate risks and benefits to both the recipient and himself  d) reasons for choosing to donate  e) voluntary nature of the donation and absence of coercion   f) follow up requirements, including benefit and need     He was also given the following information:   a) the confidential nature of the interview   b) the commitment of the donor advocate to the rights, interests, and well being of the potential donor  c) opportunities for deciding not to donate and assurance of confidentiality if he wishes to withdraw consent  d) access to the advocate team following the interview    It is my impression that Mr. Pastor is adequately informed; his decision is voluntary.   Thomas Alcala, PhD

## 2025-05-31 LAB
NIL(NEG) CONTROL SPOT COUNT: NORMAL
PANEL A SPOT COUNT: 0
PANEL B SPOT COUNT: 0
POS CONTROL SPOT COUNT: NORMAL
T-SPOT. TB INTERPRETATION: NEGATIVE

## 2025-06-03 ENCOUNTER — DOCUMENTATION (OUTPATIENT)
Facility: HOSPITAL | Age: 40
End: 2025-06-03
Payer: OTHER GOVERNMENT

## 2025-06-03 ENCOUNTER — DOCUMENTATION (OUTPATIENT)
Dept: TRANSPLANT | Facility: HOSPITAL | Age: 40
End: 2025-06-03
Payer: OTHER GOVERNMENT

## 2025-06-03 ENCOUNTER — COMMITTEE REVIEW (OUTPATIENT)
Facility: HOSPITAL | Age: 40
End: 2025-06-03
Payer: OTHER GOVERNMENT

## 2025-06-03 NOTE — PROGRESS NOTES
Notified Tim of the committee decision. We discussed that given his age, and diagnosis of hypertension, he is not a candidate for donation. We discussed risk of hypertension on his kidneys. He was also informed that his kidney function has been below our cutoff on more than one occasion. Tim expressed that he is disappointed but understood. I thanked him for all of the testing that he has gone through and appreciate his commitment.

## 2025-06-03 NOTE — COMMITTEE REVIEW
Evaluation Date: 7/15/2024   Committee Review Date: 6/3/2025    Organ: Kidney    Transplant Phase: Evaluation  Transplant Status: Active    Transplant Coordinator: Irlanda Ortega  Transplant Surgeon:      PCP: GENERIC EXTERNAL DATA PROVIDER    Committee Review Members:  Ethicist Thomas Alcala, PhD   Lab Krystle Saxena, MT; Dagmar Goodman, MT   Pharmacy Poppy Bassett, PharmD   Psychology Vika Herrera, PhD    Neema Gamez [facility]   Transplant Charleen Rosa RN; Jackson, Colletta, NILDA; Mariama Weiss; Carolin Jay, NILDA; Bessy River, NILDA; Irlanda Ortega, NILDA; Kamilah Ramirez RN   Transplant Nephrology Nirav Vasquez MD; Juan Carlos Son MD; Clarissa Barrios MD   Transplant Surgery Angelito Wheatley MD; Chase Desouza MD; Uzma Marie MD; Chica Luis MD; Jameel Oconnell MD   Other Cristy Donahue       Suitability: None    Relative Contraindications: None    Absolute Contraindications: None    Committee Review Decision: Declined     Committee Discussion Details: Labs and 24h ABPM reviewed. Due to age <50 with need for antihypertensive medication and low GFR, patient is not a candidate for donation.